# Patient Record
Sex: FEMALE | Race: WHITE | NOT HISPANIC OR LATINO
[De-identification: names, ages, dates, MRNs, and addresses within clinical notes are randomized per-mention and may not be internally consistent; named-entity substitution may affect disease eponyms.]

---

## 2018-09-04 ENCOUNTER — APPOINTMENT (OUTPATIENT)
Dept: UROGYNECOLOGY | Facility: CLINIC | Age: 73
End: 2018-09-04
Payer: MEDICARE

## 2018-09-04 DIAGNOSIS — N39.46 MIXED INCONTINENCE: ICD-10-CM

## 2018-09-04 DIAGNOSIS — R82.90 UNSPECIFIED ABNORMAL FINDINGS IN URINE: ICD-10-CM

## 2018-09-04 DIAGNOSIS — Z98.890 OTHER SPECIFIED POSTPROCEDURAL STATES: ICD-10-CM

## 2018-09-04 LAB
BILIRUB UR QL STRIP: NORMAL
CLARITY UR: CLEAR
COLLECTION METHOD: NORMAL
GLUCOSE UR-MCNC: NORMAL
HCG UR QL: 0.2 EU/DL
HGB UR QL STRIP.AUTO: NORMAL
KETONES UR-MCNC: NORMAL
LEUKOCYTE ESTERASE UR QL STRIP: NORMAL
NITRITE UR QL STRIP: NORMAL
PH UR STRIP: 6
PROT UR STRIP-MCNC: NORMAL
SP GR UR STRIP: 1.01

## 2018-09-04 PROCEDURE — 99213 OFFICE O/P EST LOW 20 MIN: CPT

## 2019-04-10 ENCOUNTER — APPOINTMENT (OUTPATIENT)
Dept: ORTHOPEDIC SURGERY | Facility: CLINIC | Age: 74
End: 2019-04-10

## 2019-10-04 ENCOUNTER — APPOINTMENT (OUTPATIENT)
Dept: RHEUMATOLOGY | Facility: CLINIC | Age: 74
End: 2019-10-04
Payer: MEDICARE

## 2019-10-04 VITALS
SYSTOLIC BLOOD PRESSURE: 112 MMHG | DIASTOLIC BLOOD PRESSURE: 76 MMHG | WEIGHT: 160 LBS | HEIGHT: 63 IN | OXYGEN SATURATION: 95 % | HEART RATE: 92 BPM | BODY MASS INDEX: 28.35 KG/M2 | TEMPERATURE: 98.1 F

## 2019-10-04 PROCEDURE — 99204 OFFICE O/P NEW MOD 45 MIN: CPT

## 2019-10-04 RX ORDER — ALENDRONATE SODIUM 70 MG/1
70 TABLET ORAL
Refills: 0 | Status: DISCONTINUED | COMMUNITY
End: 2019-10-04

## 2019-10-23 RX ADMIN — ZOLEDRONIC ACID 0 MG/100ML: 5 INJECTION, SOLUTION INTRAVENOUS at 00:00

## 2019-10-24 ENCOUNTER — APPOINTMENT (OUTPATIENT)
Dept: RHEUMATOLOGY | Facility: CLINIC | Age: 74
End: 2019-10-24

## 2019-10-28 ENCOUNTER — RX RENEWAL (OUTPATIENT)
Age: 74
End: 2019-10-28

## 2019-10-29 NOTE — HISTORY OF PRESENT ILLNESS
[FreeTextEntry1] : Previous treatments: Fosamax\par Prior fractures: None\par Parental hip fractures: \par Smoking history: Former\par Alcohol use: N\par Inflammatory arthritis/RA history: N\par Supplement use: Caltrate once daily, D3 50,000\par Hormone use: None. Normal age of menarche/menopause. Mid 40's.  Took estrogen/progesterone? N

## 2019-10-29 NOTE — PHYSICAL EXAM
[General Appearance - Alert] : alert [General Appearance - In No Acute Distress] : in no acute distress [General Appearance - Well Nourished] : well nourished [General Appearance - Well Developed] : well developed [General Appearance - Well-Appearing] : healthy appearing [Sclera] : the sclera and conjunctiva were normal [PERRL With Normal Accommodation] : pupils were equal in size, round, and reactive to light [Extraocular Movements] : extraocular movements were intact [Outer Ear] : the ears and nose were normal in appearance [Oropharynx] : the oropharynx was normal [Neck Appearance] : the appearance of the neck was normal [Neck Cervical Mass (___cm)] : no neck mass was observed [Jugular Venous Distention Increased] : there was no jugular-venous distention [Thyroid Diffuse Enlargement] : the thyroid was not enlarged [Thyroid Nodule] : there were no palpable thyroid nodules [Respiration, Rhythm And Depth] : normal respiratory rhythm and effort [Auscultation Breath Sounds / Voice Sounds] : lungs were clear to auscultation bilaterally [Heart Rate And Rhythm] : heart rate was normal and rhythm regular [Heart Sounds] : normal S1 and S2 [Heart Sounds Gallop] : no gallops [Murmurs] : no murmurs [Heart Sounds Pericardial Friction Rub] : no pericardial rub [Full Pulse] : the pedal pulses are present [Edema] : there was no peripheral edema [Bowel Sounds] : normal bowel sounds [Abdomen Soft] : soft [Abdomen Tenderness] : non-tender [Abdomen Mass (___ Cm)] : no abdominal mass palpated [Cervical Lymph Nodes Enlarged Posterior Bilaterally] : posterior cervical [Cervical Lymph Nodes Enlarged Anterior Bilaterally] : anterior cervical [Supraclavicular Lymph Nodes Enlarged Bilaterally] : supraclavicular [Axillary Lymph Nodes Enlarged Bilaterally] : axillary [No CVA Tenderness] : no ~M costovertebral angle tenderness [No Spinal Tenderness] : no spinal tenderness [Abnormal Walk] : normal gait [Nail Clubbing] : no clubbing  or cyanosis of the fingernails [Musculoskeletal - Swelling] : no joint swelling seen [Motor Tone] : muscle strength and tone were normal [FreeTextEntry1] : \par Hands- OA changes in B/L hands with Heberden and Austin's nodes. \par Wrists- normal\par Elbows- normal\par Shoulders- normal\par Hips- Reduced external rotation bilaterally. \par Knees- Patellofemoral crepitus bilaterally without effusion. \par Ankles- normal\par Feet- normal\par MMT 10/10 proximally/distally bilaterally.  [Skin Color & Pigmentation] : normal skin color and pigmentation [Skin Turgor] : normal skin turgor [] : no rash [Skin Lesions] : no skin lesions [Deep Tendon Reflexes (DTR)] : deep tendon reflexes were 2+ and symmetric [Sensation] : the sensory exam was normal to light touch and pinprick [Motor Exam] : the motor exam was normal [No Focal Deficits] : no focal deficits [Oriented To Time, Place, And Person] : oriented to person, place, and time [Impaired Insight] : insight and judgment were intact [Affect] : the affect was normal [Mood] : the mood was normal

## 2019-10-29 NOTE — ASSESSMENT
[FreeTextEntry1] : 73y F with osteoporosis at N noted 9/5/2019 (T-score -3.0) and LFN osteopenia (-2.4).  Her AP lumbar spine was normal L1-4, worst overall in L1 with -1.6 T-score.  BMD in [facility] is 0.617.  \par FRAX w/ Ten Year Major Osteoporotic Fracture Risk: 21.3%, Ten Year Hip Fracture Risk: 8.37%\par Pt was unable to tolerate oral bisphosphonate alendronate in the past and here to obtain treatment. Discussed ZA and Prolia today, pt is amenable to IV BPP for treatment.\par Degenerative OA affecting hands, knees, hips and lumbar spine- pain not well managed w/ OTC agents. Discussed safer alternative to NSAIDs beginning SNRI, may use NSAID sparingly.\par \par - increase caltrate twice daily w/ meals\par - c/w current D3 50,000 IU weekly as per PCP\par - labs reviewed\par - start ZA 5mg IV q12 months.  Labs q6 months including CBC, CMP w/ Mg/Phos, D3\par - start duloxetine 30mg/d for chronic MSK pain 2/2 OA\par - DEXA 9/2021 due to evaluate treatment response. If no improvements then consider Prolia SQ q6\par \par RTO 6-12 months, labs in 6 mos w/ PCP.  Re-evaluate yearly prior to infusion.

## 2019-11-09 ENCOUNTER — MOBILE ON CALL (OUTPATIENT)
Age: 74
End: 2019-11-09

## 2019-11-20 ENCOUNTER — APPOINTMENT (OUTPATIENT)
Dept: RHEUMATOLOGY | Facility: CLINIC | Age: 74
End: 2019-11-20
Payer: MEDICARE

## 2019-11-20 VITALS
DIASTOLIC BLOOD PRESSURE: 80 MMHG | TEMPERATURE: 98.4 F | SYSTOLIC BLOOD PRESSURE: 116 MMHG | OXYGEN SATURATION: 97 % | HEART RATE: 57 BPM | RESPIRATION RATE: 16 BRPM

## 2019-11-20 VITALS
RESPIRATION RATE: 17 BRPM | SYSTOLIC BLOOD PRESSURE: 128 MMHG | DIASTOLIC BLOOD PRESSURE: 67 MMHG | OXYGEN SATURATION: 98 % | TEMPERATURE: 98.4 F | HEART RATE: 60 BPM

## 2019-11-20 PROCEDURE — 96365 THER/PROPH/DIAG IV INF INIT: CPT

## 2019-11-20 RX ORDER — ZOLEDRONIC ACID 5 MG/100ML
5 INJECTION INTRAVENOUS
Qty: 0 | Refills: 0 | Status: COMPLETED | OUTPATIENT
Start: 2019-10-23

## 2019-12-27 ENCOUNTER — RX RENEWAL (OUTPATIENT)
Age: 74
End: 2019-12-27

## 2020-06-24 ENCOUNTER — APPOINTMENT (OUTPATIENT)
Dept: RHEUMATOLOGY | Facility: CLINIC | Age: 75
End: 2020-06-24

## 2020-07-16 ENCOUNTER — APPOINTMENT (OUTPATIENT)
Dept: RHEUMATOLOGY | Facility: CLINIC | Age: 75
End: 2020-07-16
Payer: MEDICARE

## 2020-07-16 VITALS
HEIGHT: 63 IN | BODY MASS INDEX: 27.46 KG/M2 | TEMPERATURE: 98.7 F | SYSTOLIC BLOOD PRESSURE: 118 MMHG | WEIGHT: 155 LBS | DIASTOLIC BLOOD PRESSURE: 73 MMHG

## 2020-07-16 PROCEDURE — 36415 COLL VENOUS BLD VENIPUNCTURE: CPT

## 2020-07-16 PROCEDURE — 99213 OFFICE O/P EST LOW 20 MIN: CPT | Mod: 25

## 2021-01-06 ENCOUNTER — APPOINTMENT (OUTPATIENT)
Dept: RHEUMATOLOGY | Facility: CLINIC | Age: 76
End: 2021-01-06
Payer: MEDICARE

## 2021-01-06 VITALS
DIASTOLIC BLOOD PRESSURE: 75 MMHG | RESPIRATION RATE: 16 BRPM | HEART RATE: 62 BPM | OXYGEN SATURATION: 97 % | SYSTOLIC BLOOD PRESSURE: 116 MMHG

## 2021-01-06 VITALS
DIASTOLIC BLOOD PRESSURE: 75 MMHG | HEART RATE: 59 BPM | RESPIRATION RATE: 16 BRPM | SYSTOLIC BLOOD PRESSURE: 124 MMHG | OXYGEN SATURATION: 100 %

## 2021-01-06 PROCEDURE — 96365 THER/PROPH/DIAG IV INF INIT: CPT

## 2021-01-06 RX ORDER — ZOLEDRONIC ACID 5 MG/100ML
5 INJECTION INTRAVENOUS
Qty: 0 | Refills: 0 | Status: COMPLETED | OUTPATIENT
Start: 2021-01-06

## 2021-02-09 ENCOUNTER — NON-APPOINTMENT (OUTPATIENT)
Age: 76
End: 2021-02-09

## 2021-02-09 ENCOUNTER — APPOINTMENT (OUTPATIENT)
Dept: GYNECOLOGIC ONCOLOGY | Facility: CLINIC | Age: 76
End: 2021-02-09
Payer: MEDICARE

## 2021-02-09 VITALS — WEIGHT: 155 LBS | BODY MASS INDEX: 27.46 KG/M2 | HEIGHT: 63 IN

## 2021-02-09 DIAGNOSIS — N83.209 UNSPECIFIED OVARIAN CYST, UNSPECIFIED SIDE: ICD-10-CM

## 2021-02-09 PROCEDURE — 76830 TRANSVAGINAL US NON-OB: CPT | Mod: 59

## 2021-02-09 PROCEDURE — 99204 OFFICE O/P NEW MOD 45 MIN: CPT | Mod: 25

## 2021-02-09 PROCEDURE — 76857 US EXAM PELVIC LIMITED: CPT | Mod: 59

## 2021-02-16 NOTE — CHIEF COMPLAINT
[FreeTextEntry1] : [facility]\par \par [facility] [phone] at [address] [place] [id number]\par

## 2021-02-16 NOTE — END OF VISIT
[FreeTextEntry3] : Written by Anahi ZHANG, acting as a scribe for Dr. Lexa Van.\par This note accurately reflects the work and decisions made by me.\par

## 2021-02-16 NOTE — HISTORY OF PRESENT ILLNESS
[FreeTextEntry1] : This 74yo ,  x 3, LMP at 50 h/o TVH by uro gyn in  for prolapse/SI, referrred by Dr. Adamson for evaluation of left ovarian cyst. Patient reports this cyst was initially found on a ctscan by her GI for workup to r/o abd mass/GI bleed. Pelvic US in May 2020 revealed nonvisualization of the 2cm left adnexal  lesion noted on Ct scan. MRI pelvis on 11/3/20-2cm left ovarian fibroma, right ovary not visualized. Denies pelvic or abdominal pains. Denies VB or vag d/c. Denies bowel or bladder issues.   \par \par Pap [id number] h/o abn paps \par Mammo-10/2020-eports wnl \par Eyurxmrrnto-0000-ktkqfe removed  \par DEXA-10/2019-osteoporosis\par

## 2021-02-16 NOTE — PHYSICAL EXAM
[Normal] : Bimanual Exam: Normal [de-identified] : Patient was interviewed and examined with chaperone present. Name of chaperone: Anahi Ochoa

## 2021-02-16 NOTE — ASSESSMENT
[FreeTextEntry1] : 76 yo female with 2cm right ovarian fibroma which appears benign. Discussed my low suspicion for malignancy. I am recommending a six month pelvic US to follow this cyst. If remains stable, she can resume yearly pelvic sonograms. \par \par In a study published in November 2018 in [facility], William et al. reviewed over 70,000 pelvic ultrasounds in a large unselected population to assess for the risk of ovarian cancer when an ovarian cyst was identified. However, their data did not support this concern particularly if the cyst was less than 7 cm or Simple cyst. We discussed that she has a solid cyst without any cystic areas which would make it more concerning for a malignancy.  She also had MRI evaluation which did not show increased perfusion, a characteristic of malignancy. We discussed there is no pelvic fluid or evidence of lymphadenopathy, which are all reassuring. The management options at this time would include surgical removal or observation. She agrees to observation given very low risk of this cyst being malignant. She understands that short of removal of the ovary we can never tell 100% if a cyst is malignant or benign, however, given all the information it is very reassuring that the cyst is benign. All questions answered.\par

## 2021-03-24 ENCOUNTER — APPOINTMENT (OUTPATIENT)
Dept: OBGYN | Facility: CLINIC | Age: 76
End: 2021-03-24

## 2021-04-07 ENCOUNTER — APPOINTMENT (OUTPATIENT)
Dept: OBGYN | Facility: CLINIC | Age: 76
End: 2021-04-07

## 2021-06-01 ENCOUNTER — FORM ENCOUNTER (OUTPATIENT)
Age: 76
End: 2021-06-01

## 2021-06-02 ENCOUNTER — APPOINTMENT (OUTPATIENT)
Dept: OBGYN | Facility: CLINIC | Age: 76
End: 2021-06-02
Payer: MEDICARE

## 2021-06-02 PROCEDURE — 99203 OFFICE O/P NEW LOW 30 MIN: CPT

## 2021-06-21 ENCOUNTER — APPOINTMENT (OUTPATIENT)
Dept: DERMATOLOGY | Facility: CLINIC | Age: 76
End: 2021-06-21

## 2021-08-05 ENCOUNTER — APPOINTMENT (OUTPATIENT)
Dept: GYNECOLOGIC ONCOLOGY | Facility: CLINIC | Age: 76
End: 2021-08-05

## 2021-08-18 ENCOUNTER — FORM ENCOUNTER (OUTPATIENT)
Age: 76
End: 2021-08-18

## 2021-10-13 ENCOUNTER — APPOINTMENT (OUTPATIENT)
Dept: RHEUMATOLOGY | Facility: CLINIC | Age: 76
End: 2021-10-13
Payer: MEDICARE

## 2021-10-13 VITALS
TEMPERATURE: 97.8 F | HEART RATE: 71 BPM | SYSTOLIC BLOOD PRESSURE: 138 MMHG | WEIGHT: 143 LBS | BODY MASS INDEX: 25.34 KG/M2 | HEIGHT: 63 IN | DIASTOLIC BLOOD PRESSURE: 78 MMHG | RESPIRATION RATE: 17 BRPM | OXYGEN SATURATION: 98 %

## 2021-10-13 DIAGNOSIS — M54.9 DORSALGIA, UNSPECIFIED: ICD-10-CM

## 2021-10-13 PROCEDURE — 99214 OFFICE O/P EST MOD 30 MIN: CPT

## 2021-10-31 PROBLEM — M54.9 BACK PAIN: Status: ACTIVE | Noted: 2019-10-04

## 2021-11-23 ENCOUNTER — APPOINTMENT (OUTPATIENT)
Dept: OBGYN | Facility: CLINIC | Age: 76
End: 2021-11-23
Payer: MEDICARE

## 2021-11-23 VITALS
WEIGHT: 143 LBS | DIASTOLIC BLOOD PRESSURE: 70 MMHG | SYSTOLIC BLOOD PRESSURE: 118 MMHG | BODY MASS INDEX: 25.34 KG/M2 | HEIGHT: 63 IN

## 2021-11-23 DIAGNOSIS — Z01.419 ENCOUNTER FOR GYNECOLOGICAL EXAMINATION (GENERAL) (ROUTINE) W/OUT ABNORMAL FINDINGS: ICD-10-CM

## 2021-11-23 PROCEDURE — G0101: CPT | Mod: GA

## 2021-11-23 NOTE — DISCUSSION/SUMMARY
[FreeTextEntry1] : health care maintenance\par -pap\par -vit D/exercise\par -breast mammo/sono up to date\par -colon screening reviewed\par -f/u PCP for annual and appropriate immunizations\par -rto 1 year\par \par stable benign appearing left ov fibroma\par -yearly imaging\par -pt asx\par \par hiatal hernia-\par -refer to Dr. Torres for consultation

## 2021-11-23 NOTE — HISTORY OF PRESENT ILLNESS
[Y] : Positive pregnancy history [FreeTextEntry1] : JANELLE LUONG is a 75 year  postmen female presenting for annual.  no complaints. pt has been following benign appearing 2cm left ovarian fibroma. asx.\par \par MRI Pelvic sono 2021\par IMPRESSION:\par Stable 2 cm left ovarian fibroma, benign.\par \par Status post hysterectomy with mild vaginal prolapse and rectocele.\par \par Pelvic with transvaginal ultrasound 21\par IMPRESSION:\par 1. Hysterectomy \par 2. Ovaries not well visualized; follow-up pelvic MRI as clinically indicated.  [PGHxTotal] : 3 [[facility]] : 3 [[facility]] : 3

## 2021-11-23 NOTE — PHYSICAL EXAM
[Chaperone Present] : A chaperone was present in the examining room during all aspects of the physical examination [FreeTextEntry1] : Blaire Helms [Appropriately responsive] : appropriately responsive [Alert] : alert [No Lymphadenopathy] : no lymphadenopathy [Examination Of The Breasts] : a normal appearance [No Masses] : no breast masses were palpable [Labia Majora] : normal [Labia Minora] : normal [Normal] : normal [Absent] : absent [Uterine Adnexae] : normal [FreeTextEntry9] : deferred

## 2021-11-23 NOTE — END OF VISIT
[FreeTextEntry3] : I, Amber Chu, acted solely as a scribe for Dr. Itz Cruz, on 11/23/2021. \par \par All medical record entries made by the scribe were at my, Dr. Itz Cruz., direction and personally dictated by me on 11/23/2021. I have personally reviewed the chart and agree that the record accurately reflects my personal performance of the history, physical exam, assessment and plan.\par

## 2022-01-24 ENCOUNTER — APPOINTMENT (OUTPATIENT)
Dept: RHEUMATOLOGY | Facility: CLINIC | Age: 77
End: 2022-01-24
Payer: MEDICARE

## 2022-01-24 VITALS
OXYGEN SATURATION: 98 % | RESPIRATION RATE: 18 BRPM | HEART RATE: 67 BPM | DIASTOLIC BLOOD PRESSURE: 78 MMHG | TEMPERATURE: 97.8 F | SYSTOLIC BLOOD PRESSURE: 121 MMHG

## 2022-01-24 VITALS
DIASTOLIC BLOOD PRESSURE: 75 MMHG | RESPIRATION RATE: 18 BRPM | SYSTOLIC BLOOD PRESSURE: 113 MMHG | HEART RATE: 56 BPM | TEMPERATURE: 97.8 F | OXYGEN SATURATION: 98 %

## 2022-01-24 PROCEDURE — 96365 THER/PROPH/DIAG IV INF INIT: CPT

## 2022-01-24 RX ORDER — ZOLEDRONIC ACID 5 MG/100ML
5 INJECTION INTRAVENOUS
Qty: 0 | Refills: 0 | Status: COMPLETED | OUTPATIENT
Start: 2022-01-18

## 2022-01-26 ENCOUNTER — NON-APPOINTMENT (OUTPATIENT)
Age: 77
End: 2022-01-26

## 2022-07-13 ENCOUNTER — APPOINTMENT (OUTPATIENT)
Dept: RHEUMATOLOGY | Facility: CLINIC | Age: 77
End: 2022-07-13

## 2022-07-13 VITALS — DIASTOLIC BLOOD PRESSURE: 76 MMHG | SYSTOLIC BLOOD PRESSURE: 110 MMHG | TEMPERATURE: 98.1 F | HEIGHT: 63 IN

## 2022-07-13 PROCEDURE — 99214 OFFICE O/P EST MOD 30 MIN: CPT

## 2022-07-13 RX ORDER — NIRMATRELVIR AND RITONAVIR 300-100 MG
20 X 150 MG & KIT ORAL
Qty: 30 | Refills: 0 | Status: COMPLETED | COMMUNITY
Start: 2022-06-06

## 2022-07-13 RX ORDER — ERGOCALCIFEROL 1.25 MG/1
1.25 MG CAPSULE, LIQUID FILLED ORAL
Qty: 12 | Refills: 3 | Status: ACTIVE | COMMUNITY
Start: 1900-01-01 | End: 1900-01-01

## 2022-11-01 ENCOUNTER — APPOINTMENT (OUTPATIENT)
Dept: OTOLARYNGOLOGY | Facility: CLINIC | Age: 77
End: 2022-11-01

## 2022-11-01 VITALS
WEIGHT: 143 LBS | HEIGHT: 63 IN | DIASTOLIC BLOOD PRESSURE: 83 MMHG | BODY MASS INDEX: 25.34 KG/M2 | SYSTOLIC BLOOD PRESSURE: 130 MMHG | HEART RATE: 71 BPM

## 2022-11-01 PROCEDURE — 99203 OFFICE O/P NEW LOW 30 MIN: CPT

## 2022-11-01 NOTE — PHYSICAL EXAM
[FreeTextEntry1] : mobile L TOP tumor [Midline] : trachea located in midline position [Normal] : no rashes

## 2022-11-01 NOTE — CONSULT LETTER
[Dear  ___] : Dear  [unfilled], [Consult Letter:] : I had the pleasure of evaluating your patient, [unfilled]. [Please see my note below.] : Please see my note below. [Consult Closing:] : Thank you very much for allowing me to participate in the care of this patient.  If you have any questions, please do not hesitate to contact me. [Sincerely,] : Sincerely, [FreeTextEntry2] : Adriana Bangura MD  [FreeTextEntry3] : Jack Hill MD, FACS\par \par    [facility]\par Associate Chair\par    Department of Otolaryngology\par \par Professor\par Otolaryngology & Molecular Medicine\par [facility] [employer]\par

## 2022-11-01 NOTE — HISTORY OF PRESENT ILLNESS
[None] : No associated symptoms are reported. [de-identified] : Ms. 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 \par Has not had biopsy. \par Denies pain, dysphagia, dysphonia and or dyspnea. No otalgia. \par No cardiac or lung issues. Recently told to have anemia. SHe is on Xarelto 20mg daily with  ASA 81mg for PVD stents.\par former smoker. Smoked about 1/2 ppd, quit 8 years ago. no alcohol. \par \par no hx skin CA.

## 2022-12-02 ENCOUNTER — RESULT REVIEW (OUTPATIENT)
Age: 77
End: 2022-12-02

## 2022-12-02 ENCOUNTER — APPOINTMENT (OUTPATIENT)
Dept: INTERVENTIONAL RADIOLOGY/VASCULAR | Facility: CLINIC | Age: 77
End: 2022-12-02

## 2022-12-02 ENCOUNTER — OUTPATIENT (OUTPATIENT)
Dept: OUTPATIENT SERVICES | Facility: HOSPITAL | Age: 77
LOS: 1 days | End: 2022-12-02

## 2022-12-02 DIAGNOSIS — K11.9 DISEASE OF SALIVARY GLAND, UNSPECIFIED: ICD-10-CM

## 2022-12-02 DIAGNOSIS — Z98.89 OTHER SPECIFIED POSTPROCEDURAL STATES: Chronic | ICD-10-CM

## 2022-12-02 PROCEDURE — 10005 FNA BX W/US GDN 1ST LES: CPT

## 2022-12-03 PROCEDURE — 88173 CYTOPATH EVAL FNA REPORT: CPT | Mod: 26

## 2022-12-03 PROCEDURE — 88305 TISSUE EXAM BY PATHOLOGIST: CPT | Mod: 26

## 2022-12-06 ENCOUNTER — TRANSCRIPTION ENCOUNTER (OUTPATIENT)
Age: 77
End: 2022-12-06

## 2022-12-06 LAB — NON-GYNECOLOGICAL CYTOLOGY STUDY: SIGNIFICANT CHANGE UP

## 2022-12-07 DIAGNOSIS — Z87.891 PERSONAL HISTORY OF NICOTINE DEPENDENCE: ICD-10-CM

## 2023-07-12 ENCOUNTER — APPOINTMENT (OUTPATIENT)
Dept: RHEUMATOLOGY | Facility: CLINIC | Age: 78
End: 2023-07-12
Payer: MEDICARE

## 2023-07-12 VITALS
SYSTOLIC BLOOD PRESSURE: 98 MMHG | HEIGHT: 63 IN | DIASTOLIC BLOOD PRESSURE: 58 MMHG | OXYGEN SATURATION: 96 % | HEART RATE: 89 BPM

## 2023-07-12 DIAGNOSIS — M47.816 SPONDYLOSIS W/OUT MYELOPATHY OR RADICULOPATHY, LUMBAR REGION: ICD-10-CM

## 2023-07-12 DIAGNOSIS — Z79.83 AGE-RELATED OSTEOPOROSIS W/OUT CURRENT PATHOLOGICAL FRACTURE: ICD-10-CM

## 2023-07-12 DIAGNOSIS — M81.0 AGE-RELATED OSTEOPOROSIS W/OUT CURRENT PATHOLOGICAL FRACTURE: ICD-10-CM

## 2023-07-12 PROCEDURE — 99214 OFFICE O/P EST MOD 30 MIN: CPT

## 2023-07-12 RX ORDER — ZOLEDRONIC ACID 5 MG/100ML
5 INJECTION INTRAVENOUS
Refills: 0 | Status: COMPLETED | OUTPATIENT
Start: 2023-07-12 | End: 1900-01-01

## 2023-07-12 RX ADMIN — ZOLEDRONIC ACID 0 MG/100ML: 5 INJECTION, SOLUTION INTRAVENOUS at 00:00

## 2023-07-14 PROBLEM — M81.0 POST-MENOPAUSAL OSTEOPOROSIS: Status: ACTIVE | Noted: 2019-10-04

## 2023-07-14 PROBLEM — M81.0 ENCOUNTER FOR ONGOING OSTEOPOROSIS BISPHOSPHONATE THERAPY: Status: ACTIVE | Noted: 2020-07-27

## 2023-07-14 PROBLEM — M47.816 LUMBAR SPONDYLOSIS: Status: ACTIVE | Noted: 2019-10-04

## 2023-08-01 ENCOUNTER — NON-APPOINTMENT (OUTPATIENT)
Age: 78
End: 2023-08-01

## 2023-08-01 DIAGNOSIS — Z82.49 FAMILY HISTORY OF ISCHEMIC HEART DISEASE AND OTHER DISEASES OF THE CIRCULATORY SYSTEM: ICD-10-CM

## 2023-08-01 DIAGNOSIS — I95.89 OTHER HYPOTENSION: ICD-10-CM

## 2023-08-01 DIAGNOSIS — Z92.89 PERSONAL HISTORY OF OTHER MEDICAL TREATMENT: ICD-10-CM

## 2023-08-01 DIAGNOSIS — R42 DIZZINESS AND GIDDINESS: ICD-10-CM

## 2023-08-01 DIAGNOSIS — R00.1 BRADYCARDIA, UNSPECIFIED: ICD-10-CM

## 2023-08-07 ENCOUNTER — APPOINTMENT (OUTPATIENT)
Dept: CARDIOLOGY | Facility: CLINIC | Age: 78
End: 2023-08-07

## 2023-08-08 ENCOUNTER — APPOINTMENT (OUTPATIENT)
Dept: CARDIOLOGY | Facility: CLINIC | Age: 78
End: 2023-08-08
Payer: MEDICARE

## 2023-08-08 VITALS
HEART RATE: 73 BPM | BODY MASS INDEX: 22.5 KG/M2 | SYSTOLIC BLOOD PRESSURE: 110 MMHG | DIASTOLIC BLOOD PRESSURE: 66 MMHG | OXYGEN SATURATION: 99 % | TEMPERATURE: 98.8 F | HEIGHT: 63 IN | WEIGHT: 127 LBS

## 2023-08-08 VITALS — SYSTOLIC BLOOD PRESSURE: 116 MMHG | DIASTOLIC BLOOD PRESSURE: 66 MMHG

## 2023-08-08 DIAGNOSIS — Z01.810 ENCOUNTER FOR PREPROCEDURAL CARDIOVASCULAR EXAMINATION: ICD-10-CM

## 2023-08-08 PROCEDURE — 93000 ELECTROCARDIOGRAM COMPLETE: CPT

## 2023-08-08 PROCEDURE — 99214 OFFICE O/P EST MOD 30 MIN: CPT

## 2023-08-08 RX ORDER — RIVAROXABAN 20 MG/1
20 TABLET, FILM COATED ORAL
Qty: 30 | Refills: 0 | Status: DISCONTINUED | COMMUNITY
Start: 2022-04-18 | End: 2023-08-08

## 2023-08-08 RX ORDER — CYCLOBENZAPRINE HCL 5 MG
5 TABLET ORAL
Refills: 0 | Status: DISCONTINUED | COMMUNITY
End: 2023-08-08

## 2023-08-08 RX ORDER — AMLODIPINE BESYLATE 5 MG/1
5 TABLET ORAL
Qty: 90 | Refills: 0 | Status: DISCONTINUED | COMMUNITY
Start: 2022-04-26 | End: 2023-08-08

## 2023-08-08 RX ORDER — ATORVASTATIN CALCIUM 40 MG/1
40 TABLET, FILM COATED ORAL
Refills: 0 | Status: DISCONTINUED | COMMUNITY
End: 2023-08-08

## 2023-08-08 RX ORDER — OMEPRAZOLE 40 MG/1
40 CAPSULE, DELAYED RELEASE ORAL DAILY
Refills: 0 | Status: ACTIVE | COMMUNITY

## 2023-08-08 RX ORDER — VALSARTAN AND HYDROCHLOROTHIAZIDE 320; 25 MG/1; MG/1
320-25 TABLET, FILM COATED ORAL
Refills: 0 | Status: DISCONTINUED | COMMUNITY
End: 2023-08-08

## 2023-08-08 RX ORDER — ATENOLOL 100 MG/1
100 TABLET ORAL
Refills: 0 | Status: DISCONTINUED | COMMUNITY
End: 2023-08-08

## 2023-08-08 RX ORDER — ZOLEDRONIC ACID 5 MG/100ML
5 INJECTION INTRAVENOUS
Qty: 1 | Refills: 0 | Status: DISCONTINUED | COMMUNITY
Start: 2019-10-04 | End: 2023-08-08

## 2023-08-08 NOTE — CARDIOLOGY SUMMARY
[de-identified] : Normal sinus rhythm, increased RS ratio V2, no significant change from 7/10/2023 [de-identified] : Active Problems - Hypercholesterolemia - Hypertension: Home blood pressure cuff accurate - Former Nicotine Dependence - Peripheral Arterial Disease: History of b/l LE percutaneous revascularization. Placed on Xarelto, aspirin, and cilostazol by vascular surgery - Family History of premature CAD: Brother CABG 40 - ECHOCARDIOGRAM: 6/22/2023, [facility], EF 55-60%, No PFO with agitated saline, trace mitral regurgitation, mild to moderate tricuspid regurgitation RVSP 23  - PHARMACOLOGIC NUCLEAR STRESS TEST: 5/25/2021, normal, EF >65% - CAROTID ULTRASOUND: 7/25/2022, LETI >70%, LICA 50-69%, LECA >50%, s/p R CEA 8/21/2022, L CEA 12/2022; CTA neck: 6/22/2023, B/L ICAs <50% - Anemia

## 2023-08-08 NOTE — HISTORY OF PRESENT ILLNESS
[FreeTextEntry1] : The patient is a 77-year-old white female with a past medical history remarkable for hypertension, hypercholesterolemia, carotid artery disease and peripheral arterial disease who presents for evaluation prior to urgent lower extremity revascularization scheduled for 8/10/2023. The patient's activity is limited by claudication.  She is chest pain and dyspnea free.  Her cholesterol from 7/6/2023 is at target.

## 2023-08-08 NOTE — PHYSICAL EXAM
[Well Developed] : well developed [Well Nourished] : well nourished [No Acute Distress] : no acute distress [Normal Conjunctiva] : normal conjunctiva [Normal Venous Pressure] : normal venous pressure [No Carotid Bruit] : no carotid bruit [Normal S1, S2] : normal S1, S2 [No Murmur] : no murmur [No Rub] : no rub [No Gallop] : no gallop [Clear Lung Fields] : clear lung fields [Good Air Entry] : good air entry [No Respiratory Distress] : no respiratory distress  [Soft] : abdomen soft [Non Tender] : non-tender [No Masses/organomegaly] : no masses/organomegaly [Normal Bowel Sounds] : normal bowel sounds [Normal Gait] : normal gait [No Edema] : no edema [No Cyanosis] : no cyanosis [No Rash] : no rash [No Skin Lesions] : no skin lesions [Moves all extremities] : moves all extremities [No Focal Deficits] : no focal deficits [Normal Speech] : normal speech [Alert and Oriented] : alert and oriented [Normal memory] : normal memory

## 2023-08-08 NOTE — DISCUSSION/SUMMARY
[FreeTextEntry1] : 1 preoperative cardiovascular evaluation: Urgent lower extremity revascularization 8/10/2023 In view of the patient's normal ejection fraction, normal stress test, and the absence of unstable angina, congestive heart failure, or severe aortic stenosis, she is at average risk for perioperative cardiovascular morbidity and mortality, and cleared from a cardiology standpoint for urgent surgery with the following recommendations: The patient was instructed to take her atenolol with a sip of water the morning of the procedure. Endocarditis prophylaxis in the post procedure monitored bed or not indicated DVT prophylaxis at your discretion Aspirin, cilostazol, and Xarelto dosing at your discretion 2 hypertension: Well-controlled 3 hypercholesterolemia: At target 7/6/2023  [EKG obtained to assist in diagnosis and management of assessed problem(s)] : EKG obtained to assist in diagnosis and management of assessed problem(s)

## 2023-08-14 ENCOUNTER — APPOINTMENT (OUTPATIENT)
Dept: CARDIOLOGY | Facility: CLINIC | Age: 78
End: 2023-08-14
Payer: MEDICARE

## 2023-08-14 VITALS
SYSTOLIC BLOOD PRESSURE: 129 MMHG | BODY MASS INDEX: 22.86 KG/M2 | HEIGHT: 63 IN | DIASTOLIC BLOOD PRESSURE: 73 MMHG | WEIGHT: 129 LBS | OXYGEN SATURATION: 98 % | HEART RATE: 84 BPM

## 2023-08-14 DIAGNOSIS — I95.2 HYPOTENSION DUE TO DRUGS: ICD-10-CM

## 2023-08-14 PROCEDURE — 99214 OFFICE O/P EST MOD 30 MIN: CPT

## 2023-08-14 RX ORDER — ATORVASTATIN CALCIUM 80 MG/1
80 TABLET, FILM COATED ORAL
Qty: 90 | Refills: 3 | Status: ACTIVE | COMMUNITY
Start: 1900-01-01 | End: 1900-01-01

## 2023-08-14 RX ORDER — EZETIMIBE 10 MG/1
10 TABLET ORAL
Qty: 90 | Refills: 3 | Status: ACTIVE | COMMUNITY
Start: 1900-01-01 | End: 1900-01-01

## 2023-08-14 NOTE — CARDIOLOGY SUMMARY
[de-identified] : Normal sinus rhythm, increased RS ratio V2, no significant change from 7/10/2023 [de-identified] : Active Problems - Hypercholesterolemia - Hypertension: Home blood pressure cuff accurate - Former Nicotine Dependence - Peripheral Arterial Disease: History of b/l LE percutaneous revascularization. Placed on Xarelto, aspirin, and cilostazol by vascular surgery - Family History of premature CAD: Brother CABG 40 - ECHOCARDIOGRAM: 6/22/2023, [facility], EF 55-60%, No PFO with agitated saline, trace mitral regurgitation, mild to moderate tricuspid regurgitation RVSP 23  - PHARMACOLOGIC NUCLEAR STRESS TEST: 5/25/2021, normal, EF >65% - CAROTID ULTRASOUND: 7/25/2022, LETI >70%, LICA 50-69%, LECA >50%, s/p R CEA 8/21/2022, L CEA 12/2022; CTA neck: 6/22/2023, B/L ICAs <50% - Anemia

## 2023-08-14 NOTE — DISCUSSION/SUMMARY
[FreeTextEntry1] : Patient presents to the office for follow up of hypotension.   Assessment/ Plan: 1. Hypotension now resolved off ARB and Amlodipine. BP well controlled on Atenolol alone. Advised to continue home BP monitoring and report consistently elevated or low BP readings.  2. hypercholesterolemia: At target 7/6/2023 3. Follow up with Dr. Lanier in 6 months as scheduled or sooner if needed.  Patient was advised to contact the office or seek emergency medical care for any new, worsening or concerning symptoms. Patient verbalized understanding and is in agreement with the above plan.  Corina Lanier was present in office at the time of visit.

## 2023-08-14 NOTE — PHYSICAL EXAM
[Well Developed] : well developed [No Acute Distress] : no acute distress [No Carotid Bruit] : no carotid bruit [Normal S1, S2] : normal S1, S2 [No Murmur] : no murmur [Clear Lung Fields] : clear lung fields [No Respiratory Distress] : no respiratory distress  [Normal Gait] : normal gait [Normal PT B/L] : normal PT B/L [Edema ___] : edema [unfilled] [Moves all extremities] : moves all extremities [Normal Speech] : normal speech [Alert and Oriented] : alert and oriented [Normal memory] : normal memory

## 2023-08-14 NOTE — HISTORY OF PRESENT ILLNESS
[FreeTextEntry1] : The patient is a 77-year-old white female with a past medical history remarkable for hypertension, hypercholesterolemia, carotid artery disease and peripheral arterial disease who presents for follow up of hypotension. She underwent urgent lower extremity revascularization on 8/10/2023; states procedure went as planned. She follows with Dr. Abrams at [facility] for vascular needs. Her cholesterol from 7/6/2023 is at target. Reports feeling well today. States she has been off of Valsartan and Amlodipine for several weeks and BP has improved back to normal range. BP at home is usually 110-120 systolic/ 50-60s diastolic. Prior to stopping her BP medications, she was feeling dizzy and lightheaded with BP < 100 systolic. Denies chest pain, SOB at rest, MURDOCK, palpitations, lightheadedness, dizziness, fatigue, syncope, near syncope and LE edema.

## 2023-09-18 ENCOUNTER — APPOINTMENT (OUTPATIENT)
Dept: RHEUMATOLOGY | Facility: CLINIC | Age: 78
End: 2023-09-18
Payer: MEDICARE

## 2023-09-18 VITALS
TEMPERATURE: 97.3 F | RESPIRATION RATE: 19 BRPM | DIASTOLIC BLOOD PRESSURE: 94 MMHG | OXYGEN SATURATION: 97 % | SYSTOLIC BLOOD PRESSURE: 167 MMHG | HEART RATE: 83 BPM

## 2023-09-18 VITALS
HEART RATE: 70 BPM | RESPIRATION RATE: 17 BRPM | DIASTOLIC BLOOD PRESSURE: 78 MMHG | OXYGEN SATURATION: 98 % | SYSTOLIC BLOOD PRESSURE: 131 MMHG | TEMPERATURE: 98.1 F

## 2023-09-18 PROCEDURE — 96365 THER/PROPH/DIAG IV INF INIT: CPT

## 2023-09-18 RX ORDER — ZOLEDRONIC ACID 5 MG/100ML
5 INJECTION INTRAVENOUS
Qty: 0 | Refills: 0 | Status: COMPLETED
Start: 2023-07-12

## 2023-10-23 ENCOUNTER — APPOINTMENT (OUTPATIENT)
Dept: CARDIOLOGY | Facility: CLINIC | Age: 78
End: 2023-10-23
Payer: MEDICARE

## 2023-10-23 VITALS
HEIGHT: 63 IN | BODY MASS INDEX: 22.15 KG/M2 | HEART RATE: 87 BPM | DIASTOLIC BLOOD PRESSURE: 66 MMHG | WEIGHT: 125 LBS | OXYGEN SATURATION: 99 % | SYSTOLIC BLOOD PRESSURE: 108 MMHG

## 2023-10-23 DIAGNOSIS — I77.9 DISORDER OF ARTERIES AND ARTERIOLES, UNSPECIFIED: ICD-10-CM

## 2023-10-23 DIAGNOSIS — I95.9 HYPOTENSION, UNSPECIFIED: ICD-10-CM

## 2023-10-23 PROCEDURE — 99214 OFFICE O/P EST MOD 30 MIN: CPT

## 2023-10-23 RX ORDER — CHROMIUM 200 MCG
800 TABLET ORAL
Refills: 0 | Status: DISCONTINUED | COMMUNITY
End: 2023-10-23

## 2023-10-23 RX ORDER — CILOSTAZOL 100 MG/1
100 TABLET ORAL TWICE DAILY
Refills: 0 | Status: DISCONTINUED | COMMUNITY
End: 2023-10-23

## 2023-10-23 RX ORDER — CYANOCOBALAMIN (VITAMIN B-12) 1000 MCG
1000 TABLET ORAL DAILY
Refills: 0 | Status: ACTIVE | COMMUNITY

## 2023-10-23 RX ORDER — PREDNISONE 20 MG/1
20 TABLET ORAL
Qty: 10 | Refills: 2 | Status: DISCONTINUED | COMMUNITY
Start: 2020-09-01 | End: 2023-10-23

## 2023-10-23 RX ORDER — VALSARTAN 320 MG/1
320 TABLET, COATED ORAL
Qty: 90 | Refills: 0 | Status: DISCONTINUED | COMMUNITY
Start: 2022-01-10 | End: 2023-10-23

## 2023-10-23 RX ORDER — DULOXETINE HYDROCHLORIDE 30 MG/1
30 CAPSULE, DELAYED RELEASE PELLETS ORAL DAILY
Qty: 180 | Refills: 3 | Status: DISCONTINUED | COMMUNITY
Start: 2019-10-04 | End: 2023-10-23

## 2023-10-23 RX ORDER — DM/PE/ACETAMINOPHEN/CHLORPHENR 10-5-325-2
600-800 TABLET, SEQUENTIAL ORAL WEEKLY
Refills: 0 | Status: ACTIVE | COMMUNITY

## 2023-11-20 ENCOUNTER — NON-APPOINTMENT (OUTPATIENT)
Age: 78
End: 2023-11-20

## 2023-11-20 ENCOUNTER — APPOINTMENT (OUTPATIENT)
Dept: CARDIOLOGY | Facility: CLINIC | Age: 78
End: 2023-11-20
Payer: MEDICARE

## 2023-11-20 VITALS
HEART RATE: 68 BPM | DIASTOLIC BLOOD PRESSURE: 80 MMHG | BODY MASS INDEX: 21.97 KG/M2 | OXYGEN SATURATION: 97 % | HEIGHT: 63 IN | WEIGHT: 124 LBS | SYSTOLIC BLOOD PRESSURE: 132 MMHG

## 2023-11-20 DIAGNOSIS — R42 DIZZINESS AND GIDDINESS: ICD-10-CM

## 2023-11-20 PROCEDURE — 99214 OFFICE O/P EST MOD 30 MIN: CPT

## 2023-11-20 PROCEDURE — 93000 ELECTROCARDIOGRAM COMPLETE: CPT

## 2023-11-20 RX ORDER — RIVAROXABAN 2.5 MG/1
2.5 TABLET, FILM COATED ORAL
Qty: 60 | Refills: 3 | Status: ACTIVE | COMMUNITY

## 2023-11-21 ENCOUNTER — APPOINTMENT (OUTPATIENT)
Dept: ULTRASOUND IMAGING | Facility: CLINIC | Age: 78
End: 2023-11-21

## 2023-11-21 ENCOUNTER — OUTPATIENT (OUTPATIENT)
Dept: OUTPATIENT SERVICES | Facility: HOSPITAL | Age: 78
LOS: 1 days | End: 2023-11-21
Payer: MEDICARE

## 2023-11-21 DIAGNOSIS — Z98.89 OTHER SPECIFIED POSTPROCEDURAL STATES: Chronic | ICD-10-CM

## 2023-11-21 DIAGNOSIS — Z00.8 ENCOUNTER FOR OTHER GENERAL EXAMINATION: ICD-10-CM

## 2023-11-21 PROCEDURE — 76536 US EXAM OF HEAD AND NECK: CPT | Mod: 26

## 2023-11-21 PROCEDURE — 76536 US EXAM OF HEAD AND NECK: CPT

## 2023-12-05 DIAGNOSIS — K11.9 DISEASE OF SALIVARY GLAND, UNSPECIFIED: ICD-10-CM

## 2024-02-05 ENCOUNTER — NON-APPOINTMENT (OUTPATIENT)
Age: 79
End: 2024-02-05

## 2024-02-06 ENCOUNTER — APPOINTMENT (OUTPATIENT)
Dept: UROGYNECOLOGY | Facility: CLINIC | Age: 79
End: 2024-02-06
Payer: MEDICARE

## 2024-02-06 VITALS
WEIGHT: 125 LBS | DIASTOLIC BLOOD PRESSURE: 82 MMHG | HEIGHT: 63 IN | BODY MASS INDEX: 22.15 KG/M2 | SYSTOLIC BLOOD PRESSURE: 161 MMHG

## 2024-02-06 LAB
BILIRUB UR QL STRIP: NEGATIVE
CLARITY UR: CLEAR
COLLECTION METHOD: NORMAL
GLUCOSE UR-MCNC: NEGATIVE
HCG UR QL: 1 EU/DL
HGB UR QL STRIP.AUTO: NEGATIVE
KETONES UR-MCNC: NEGATIVE
LEUKOCYTE ESTERASE UR QL STRIP: NEGATIVE
NITRITE UR QL STRIP: NEGATIVE
PH UR STRIP: 5.5
PROT UR STRIP-MCNC: NEGATIVE
SP GR UR STRIP: 1.02

## 2024-02-06 PROCEDURE — 51701 INSERT BLADDER CATHETER: CPT | Mod: 59

## 2024-02-06 PROCEDURE — 99214 OFFICE O/P EST MOD 30 MIN: CPT | Mod: 25

## 2024-02-06 PROCEDURE — 99459 PELVIC EXAMINATION: CPT

## 2024-02-06 PROCEDURE — 81003 URINALYSIS AUTO W/O SCOPE: CPT | Mod: QW

## 2024-02-06 NOTE — PHYSICAL EXAM
[Chaperone Present] : A chaperone was present in the examining room during all aspects of the physical examination [No Acute Distress] : in no acute distress [Well developed] : well developed [Well Nourished] : ~L well nourished [Oriented x3] : oriented to person, place, and time [No Edema] : ~T edema was not present [Warm and Dry] : was warm and dry to touch [Vulvar Atrophy] : vulvar atrophy [Labia Majora] : were normal [Labia Minora] : were normal [Normal Appearance] : general appearance was normal [Atrophy] : atrophy [Normal] : no abnormalities [Post Void Residual ____ml] : post void residual was [unfilled] ml [Absent] : absent [Cough] : no cough [Tenderness] : ~T no ~M abdominal tenderness observed [Distended] : not distended [de-identified] : no POP

## 2024-02-06 NOTE — HISTORY OF PRESENT ILLNESS
[Urinary Frequency] : no [x1] : nocturia once nightly [Urinary Tract Infection] : mild [] : yes [FreeTextEntry1] : JANELLE is a 78 year female who presents s/p TVH uterosacral TOT sling in 2015. Patient is here with her . C/o urinary symptoms.   Patient states for several months she feels that she might leak. Wears pads daily, but she has never leaked or had a wet pad. Voids about 2-3 times a day, rare nocturia, denies feeling incomplete emptying, dysuria. Daily fluid: 5 bottles of propel and 2-3 cups of coffee    Previous treatment: None  Vaginal symptoms: Denies Bowel symptoms: BM every 2-3 days, denies rectal bleeding   OB:  3, largest 8lb   GYN: 2cm left ov. fibroma Dr. Cruz observing, last pap  neg, No estrogen use, not sexually active  PMH: Back Problem, hernia, HTN, HLD PSH: hysterectomy ( unsure if partial or total)  Dr. Fiore, Carotid art. sx , vascular sx in leg  Meds: ASA, Atenolol, xarelto, Omeprazole, atorvistatin duloxetine Allx: NKDA  Former smoker, quit 5 years ago.

## 2024-02-06 NOTE — REASON FOR VISIT
[Initial Visit ___] : an initial visit for [unfilled] [Questionnaire Received] : Patient questionnaire received [Intake Form Reviewed] : Patient intake form with past medical history, surgical history, family history and social history reviewed today [Urinary Incontinence] : urinary incontinence [Spouse] : spouse [Urinary Urgency] : urinary urgency

## 2024-02-06 NOTE — ADDENDUM
[FreeTextEntry1] : This note was written by Georgette Gonzalez, acting as the  for Dr. Gutierrez. This note accurately reflects the work and decisions made by Dr. Gutierrez.

## 2024-02-06 NOTE — PROCEDURE
[Straight Catheterization] : insertion of a straight catheter [Urinary Frequency] : urinary frequency [Patient] : the patient [___ Fr Straight Tip] : a [unfilled] in [place] straight tip catheter [None] : there were no complications with the catheter insertion [Clear] : clear [No Complications] : no complications [Tolerated Well] : the patient tolerated the procedure well [Post procedure instructions and information given] : Post procedure instructions and information were given and reviewed with patient. [0] : 0

## 2024-02-06 NOTE — DISCUSSION/SUMMARY
[FreeTextEntry1] : JANELLE is a 78 year female who presents for urinary urgency. On exam, negative CST, normal PVR, no POP.  We reviewed her symptoms and exam findings. We discussed management options for overactive bladder including observation, behavioral modifications and bladder training, physical therapy and medications including anticholinergics and beta 3 agonists We discussed additional treatment options including sacral neuromodulation, PTNS and intra detrusor Botox. IUGA handout on overactive bladder and bladder training was given to her.  [] Pelvic sono ordered  [] Cysto  risks/benefits discussed [] BT [] Estrace - risks/benefits discussed/rx sent   All questions answered with patient and her .

## 2024-02-08 ENCOUNTER — NON-APPOINTMENT (OUTPATIENT)
Age: 79
End: 2024-02-08

## 2024-02-12 ENCOUNTER — APPOINTMENT (OUTPATIENT)
Dept: CARDIOLOGY | Facility: CLINIC | Age: 79
End: 2024-02-12
Payer: MEDICARE

## 2024-02-12 VITALS
BODY MASS INDEX: 22.86 KG/M2 | HEIGHT: 63 IN | WEIGHT: 129 LBS | DIASTOLIC BLOOD PRESSURE: 88 MMHG | OXYGEN SATURATION: 99 % | SYSTOLIC BLOOD PRESSURE: 130 MMHG | HEART RATE: 45 BPM

## 2024-02-12 DIAGNOSIS — R94.31 ABNORMAL ELECTROCARDIOGRAM [ECG] [EKG]: ICD-10-CM

## 2024-02-12 DIAGNOSIS — I73.9 PERIPHERAL VASCULAR DISEASE, UNSPECIFIED: ICD-10-CM

## 2024-02-12 DIAGNOSIS — I10 ESSENTIAL (PRIMARY) HYPERTENSION: ICD-10-CM

## 2024-02-12 DIAGNOSIS — E78.00 PURE HYPERCHOLESTEROLEMIA, UNSPECIFIED: ICD-10-CM

## 2024-02-12 DIAGNOSIS — I65.29 OCCLUSION AND STENOSIS OF UNSPECIFIED CAROTID ARTERY: ICD-10-CM

## 2024-02-12 PROCEDURE — 99214 OFFICE O/P EST MOD 30 MIN: CPT

## 2024-02-12 PROCEDURE — 93000 ELECTROCARDIOGRAM COMPLETE: CPT

## 2024-02-12 RX ORDER — ASCORBIC ACID 500 MG
500 TABLET ORAL DAILY
Refills: 0 | Status: ACTIVE | COMMUNITY

## 2024-02-12 NOTE — DISCUSSION/SUMMARY
[FreeTextEntry1] : hypotension/dizziness Continue to remain off of amlodipine and the ARB.  Continue atenolol 25 mg 1/2 tablet daily.  Continue to monitor home blood pressure readings  hypercholesterolemia Laboratories ordered   peripheral arterial disease History of carotid endarterectomy/lower extremity revascularization Follow-up with vascular  RTO 6 months [EKG obtained to assist in diagnosis and management of assessed problem(s)] : EKG obtained to assist in diagnosis and management of assessed problem(s)

## 2024-02-12 NOTE — CARDIOLOGY SUMMARY
[de-identified] : Normal sinus rhythm, increased RS ratio V2 [de-identified] : - Hypercholesterolemia - Hypertension: Home blood pressure cuff accurate.  Amlodipine and the patient's ARB were discontinued due to hypotension in October - Former Nicotine Dependence - Peripheral Arterial Disease: History of b/l LE percutaneous revascularization. Placed on Xarelto, aspirin, and cilostazol by vascular surgery - Family History of premature CAD: Brother CABG 40 - ECHOCARDIOGRAM: 6/22/2023, [facility], EF 55-60%, No PFO with agitated saline, trace mitral regurgitation, mild to moderate tricuspid regurgitation RVSP 23  - PHARMACOLOGIC NUCLEAR STRESS TEST: 5/25/2021, normal, EF >65% - CAROTID ULTRASOUND: 7/25/2022, LETI >70%, LICA 50-69%, LECA >50%, s/p R CEA 8/21/2022, L CEA 12/2022; CTA neck: 6/22/2023, B/L ICAs <50% - Anemia

## 2024-02-12 NOTE — HISTORY OF PRESENT ILLNESS
[FreeTextEntry1] : The patient is a 77-year-old white female with a past medical history remarkable for hypertension, hypercholesterolemia, carotid artery disease and peripheral arterial disease who presents for reevaluation of hypotension. The patient's ARB and amlodipine were discontinued.  The patient's blood pressure is elevated today.  The patient reports that her blood pressure is normal at home and at recent doctor visits.   The patient reports that she exercises on a stationary bicycle for 5 to 10 minutes 2 to 3 days/week without chest pain.

## 2024-02-29 ENCOUNTER — APPOINTMENT (OUTPATIENT)
Dept: OBGYN | Facility: CLINIC | Age: 79
End: 2024-02-29
Payer: MEDICARE

## 2024-02-29 VITALS
HEIGHT: 63 IN | BODY MASS INDEX: 22.32 KG/M2 | WEIGHT: 126 LBS | SYSTOLIC BLOOD PRESSURE: 142 MMHG | DIASTOLIC BLOOD PRESSURE: 88 MMHG

## 2024-02-29 DIAGNOSIS — Z83.3 FAMILY HISTORY OF DIABETES MELLITUS: ICD-10-CM

## 2024-02-29 DIAGNOSIS — Z78.9 OTHER SPECIFIED HEALTH STATUS: ICD-10-CM

## 2024-02-29 PROCEDURE — G0101: CPT

## 2024-02-29 NOTE — DISCUSSION/SUMMARY
[FreeTextEntry1] : 78 year  who presents s/p TVH uterosacral TOT sling in 2015 her for gyn annual  PCP: Dr. Bangura    #Well Woman Visit  1. Nutrition/Activity: The benefits of physical activity and balanced diet.  2. Health Screening: She was informed of the benefits of a screening colonoscopy/DEXA/Mammo/Pap. - Cervix: We reviewed ASCCP/ACOG guidelines for pap smear screening.  Hx of TVH for benign indication, no longer requires screening PAP  - Mammogram ordered 3. Sex Health: The importance of safe-sex practices was discussed with the patient. STD screening was offered to patient, she declines as she is not sexually active. 4. Osteoporosis, was unable to tolerate bisphosphonate treatment.  Started on IV BPP for management.  Encouraged vitamin D and calcium supplementation with weightbearing exercises.  If unable to tolerate weightbearing exercises, encouraged water-based aerobic exercises 5.  Urge incontinence: Started on vaginal estrace treatment. Plan for cystoscopy and bladder training. 6. Ovarian fibroma, most recent pelvic US WNL.  Unable to visualize ovaries.  Denies any symptoms at bedside.  Will repeat sonogram when 1 year     She verbalized understanding and agreement with above counseling regarding differential diagnosis, evaluation, and plan. She was given time for questions/concerns which were all answered to her apparent satisfaction.  RTO 1 year for annual

## 2024-02-29 NOTE — COUNSELING
[Nutrition/ Exercise/ Weight Management] : nutrition, exercise, weight management [Body Image] : body image [Sunscreen] : sunscreen [Vitamins/Supplements] : vitamins/supplements [Smoking Cessation] : smoking cessation [Breast Self Exam] : breast self exam [Confidentiality] : confidentiality [Bladder Hygiene] : bladder hygiene [Lab Results] : lab results [Medication Management] : medication management

## 2024-02-29 NOTE — HISTORY OF PRESENT ILLNESS
[N] : Patient is not sexually active [Regular Cycle Intervals] : periods have been regular [Menarche Age: ____] : age at menarche was [unfilled] [FreeTextEntry1] : 78 year  who presents s/p TVH uterosacral TOT sling in 2015 her for gyn annual  Overall doing well.  Was previously following with Dr. TIERNEY, last seen 2021 for GYN annual.  History of benign-appearing 2 cm left ovarian fibroma.  Asymptomatic.  Denies any pelvic bloating, pelvic pain or abdominal distention.  No longer currently sexually active.  Currently following with urogynecology for management of urgency incontinence.  Was started on vaginal estrogen cream with improvement of symptoms.  Denies any vaginal bleeding, or issues with constipation.  No further complaints at this time   Screening: - DEXA (2021): Osteoporosis- following with rheumatology for management - Mammogram (2023): BIRAD-2   OB:  3, largest 8lb GYN: 2cm left ov. fibroma Dr. Cruz observing, last pap  neg, No estrogen use, not sexually active PMH: Back Problem, hernia, HTN, HLD PSH: hysterectomy ( unsure if partial or total)  Dr. Fiore, Carotid art. sx , vascular sx in leg  Meds: ASA, Atenolol, xarelto, Omeprazole, atorvistatin duloxetine Allx: NKDA Former smoker, quit 5 years ago. [PGHxTotal] : 3 [PGHxPremature] : 0 [[facility]] : 3 [PGHxAbortions] : 0 [PGHxABInduced] : 0 [[facility]] : 3 [PGHxEctopic] : 0 [PGHxABSpont] : 0 [PGHxMultBirths] : 0

## 2024-02-29 NOTE — PHYSICAL EXAM
[Chaperone Present] : A chaperone was present in the examining room during all aspects of the physical examination [Appropriately responsive] : appropriately responsive [Alert] : alert [No Acute Distress] : no acute distress [Soft] : soft [No Lesions] : no lesions [Non-tender] : non-tender [Oriented x3] : oriented x3 [No Mass] : no mass [Examination Of The Breasts] : a normal appearance [No Masses] : no breast masses were palpable [Vulvar Atrophy] : vulvar atrophy [Labia Minora] : normal [Labia Majora] : normal [Normal] :  normal [Atrophy] : atrophy [Absent] : absent [Uterine Adnexae] : non-palpable

## 2024-03-01 ENCOUNTER — NON-APPOINTMENT (OUTPATIENT)
Age: 79
End: 2024-03-01

## 2024-03-12 ENCOUNTER — APPOINTMENT (OUTPATIENT)
Dept: UROGYNECOLOGY | Facility: CLINIC | Age: 79
End: 2024-03-12
Payer: MEDICARE

## 2024-03-12 PROCEDURE — 52000 CYSTOURETHROSCOPY: CPT

## 2024-03-12 PROCEDURE — 81003 URINALYSIS AUTO W/O SCOPE: CPT | Mod: QW

## 2024-03-12 RX ORDER — ESTRADIOL 0.1 MG/G
0.1 CREAM VAGINAL
Qty: 1 | Refills: 0 | Status: ACTIVE | COMMUNITY
Start: 2024-02-06 | End: 1900-01-01

## 2024-03-14 ENCOUNTER — NON-APPOINTMENT (OUTPATIENT)
Age: 79
End: 2024-03-14

## 2024-03-25 ENCOUNTER — APPOINTMENT (OUTPATIENT)
Dept: OBGYN | Facility: CLINIC | Age: 79
End: 2024-03-25
Payer: MEDICARE

## 2024-03-25 VITALS
SYSTOLIC BLOOD PRESSURE: 144 MMHG | WEIGHT: 126 LBS | DIASTOLIC BLOOD PRESSURE: 82 MMHG | HEIGHT: 63 IN | BODY MASS INDEX: 22.32 KG/M2

## 2024-03-25 DIAGNOSIS — Z12.39 ENCOUNTER FOR OTHER SCREENING FOR MALIGNANT NEOPLASM OF BREAST: ICD-10-CM

## 2024-03-25 DIAGNOSIS — N90.5 ATROPHY OF VULVA: ICD-10-CM

## 2024-03-25 PROCEDURE — 99214 OFFICE O/P EST MOD 30 MIN: CPT

## 2024-03-26 PROBLEM — Z12.39 BREAST CANCER SCREENING: Status: ACTIVE | Noted: 2024-02-29

## 2024-03-26 NOTE — DISCUSSION/SUMMARY
[FreeTextEntry1] : 78 year  (s/p TV) who presents for follow regarding mammogram and medication use.  #Vaginal atrophy - Discussed the treatment of genitourinary syndrome of menopause using vaginal estrogen - Discussed role of vaginal estrogen with alleviation of symptoms such as genital dryness, burning, and irritation, dyspareunia; urinary symptoms such as urgency or dysuria; and recurrent urinary tract infections related to GSM - MC side effects discussed: vaginal irritation or itching and, vaginal bleeding  #Results - Mammogram: BIRADS-2 - Discussed with patient DEXA scan demonstrating osteoporosis. Discussed vitamin D and calcium supplements. Following with rheumatology for treatment. Next appt 2024. Encouraged earlier visit

## 2024-03-26 NOTE — HISTORY OF PRESENT ILLNESS
[FreeTextEntry1] : 78 year  (s/p TVH) who presents for follow regarding mammogram and medication use.  Patient here to discuss vaginal estrogen use. Wants to discuss what it's role is, and how to use it safely. Also here to discuss mammogram results

## 2024-03-26 NOTE — PHYSICAL EXAM
[Appropriately responsive] : appropriately responsive [Chaperone Present] : A chaperone was present in the examining room during all aspects of the physical examination [No Acute Distress] : no acute distress [Alert] : alert [Oriented x3] : oriented x3

## 2024-03-26 NOTE — COUNSELING
[Nutrition/ Exercise/ Weight Management] : nutrition, exercise, weight management [Vitamins/Supplements] : vitamins/supplements [Body Image] : body image [Sunscreen] : sunscreen [Breast Self Exam] : breast self exam [Confidentiality] : confidentiality [Bladder Hygiene] : bladder hygiene [Medication Management] : medication management [Lab Results] : lab results

## 2024-04-01 RX ORDER — BACLOFEN 10 MG/1
10 TABLET ORAL EVERY 8 HOURS
Qty: 90 | Refills: 3 | Status: ACTIVE | COMMUNITY
Start: 2021-10-13 | End: 1900-01-01

## 2024-04-03 RX ORDER — ATENOLOL 25 MG/1
25 TABLET ORAL DAILY
Qty: 45 | Refills: 1 | Status: ACTIVE | COMMUNITY
Start: 1900-01-01 | End: 1900-01-01

## 2024-06-18 ENCOUNTER — APPOINTMENT (OUTPATIENT)
Dept: UROGYNECOLOGY | Facility: CLINIC | Age: 79
End: 2024-06-18
Payer: MEDICARE

## 2024-06-18 VITALS
HEIGHT: 63 IN | DIASTOLIC BLOOD PRESSURE: 97 MMHG | WEIGHT: 125 LBS | SYSTOLIC BLOOD PRESSURE: 159 MMHG | BODY MASS INDEX: 22.15 KG/M2

## 2024-06-18 LAB
BILIRUB UR QL STRIP: ABNORMAL
CLARITY UR: CLEAR
COLLECTION METHOD: NORMAL
GLUCOSE UR-MCNC: NEGATIVE
HCG UR QL: 1 EU/DL
HGB UR QL STRIP.AUTO: NEGATIVE
KETONES UR-MCNC: NEGATIVE
LEUKOCYTE ESTERASE UR QL STRIP: NEGATIVE
NITRITE UR QL STRIP: NEGATIVE
PH UR STRIP: 6
PROT UR STRIP-MCNC: NEGATIVE
SP GR UR STRIP: 1.02

## 2024-06-18 PROCEDURE — 81003 URINALYSIS AUTO W/O SCOPE: CPT | Mod: QW

## 2024-06-18 PROCEDURE — 51798 US URINE CAPACITY MEASURE: CPT

## 2024-06-18 PROCEDURE — 99214 OFFICE O/P EST MOD 30 MIN: CPT

## 2024-06-18 RX ORDER — ESTRADIOL 0.1 MG/G
0.1 CREAM VAGINAL
Qty: 1 | Refills: 0 | Status: ACTIVE | COMMUNITY
Start: 2024-06-18 | End: 1900-01-01

## 2024-06-18 RX ORDER — VIBEGRON 75 MG/1
75 TABLET, FILM COATED ORAL
Qty: 90 | Refills: 3 | Status: ACTIVE | COMMUNITY
Start: 2024-06-18 | End: 1900-01-01

## 2024-06-18 NOTE — PHYSICAL EXAM
[Chaperone Present] : A chaperone was present in the examining room during all aspects of the physical examination [No Acute Distress] : in no acute distress [Well developed] : well developed [Well Nourished] : ~L well nourished [Oriented x3] : oriented to person, place, and time [FreeTextEntry2] :  Heavenly Willis

## 2024-06-18 NOTE — HISTORY OF PRESENT ILLNESS
[Urinary Frequency] : no [x1] : nocturia once nightly [Urinary Tract Infection] : mild [] : yes [FreeTextEntry1] : JANELLE is a 78 year female who presents for f/u on urinary urgency. Last seen in office on 03/12/2024 for cysto, negative results. H/o TVH uterosacral TOT sling in 2015. Currently using Estrace 2-3 times weekly. Patient is doing good. Wears depends when she goes out of the house, sometimes has to change it. Reports no bothersome urinary symptoms.

## 2024-06-18 NOTE — ADDENDUM
[FreeTextEntry1] : This note was written by Heavenly Willis, acting as the  for Dr. Gutierrez. This note accurately reflects the work and decisions made by Dr. Gutierrez.

## 2024-06-18 NOTE — DISCUSSION/SUMMARY
[FreeTextEntry1] : JANELLE is a 78 year female who presents for f/u on OAB. Reports urinary incontinence.  We reviewed her symptoms and exam findings. We discussed management options for overactive bladder including observation, behavioral modifications and bladder training, physical therapy and medications including anticholinergics and beta 3 agonists. We discussed if behavioral modifications and medications fail proceeding with urodynamics to further evaluate her symptoms. We discussed additional treatment options including sacral neuromodulation, PTNS and intra detrusor Botox. IUGA handout on overactive bladder and bladder training was given to her.  [] Continue Estrace - renew rx [] Gemtesa 75mg - rx sent risks/benefits discussed  f/u 1 month  All questions answered.

## 2024-07-10 ENCOUNTER — RX RENEWAL (OUTPATIENT)
Age: 79
End: 2024-07-10

## 2024-07-10 ENCOUNTER — APPOINTMENT (OUTPATIENT)
Dept: RHEUMATOLOGY | Facility: CLINIC | Age: 79
End: 2024-07-10

## 2024-07-10 VITALS
HEART RATE: 75 BPM | SYSTOLIC BLOOD PRESSURE: 148 MMHG | DIASTOLIC BLOOD PRESSURE: 90 MMHG | OXYGEN SATURATION: 98 % | HEIGHT: 63 IN

## 2024-07-10 DIAGNOSIS — M54.9 DORSALGIA, UNSPECIFIED: ICD-10-CM

## 2024-07-10 DIAGNOSIS — Z79.83 AGE-RELATED OSTEOPOROSIS W/OUT CURRENT PATHOLOGICAL FRACTURE: ICD-10-CM

## 2024-07-10 DIAGNOSIS — M81.0 AGE-RELATED OSTEOPOROSIS W/OUT CURRENT PATHOLOGICAL FRACTURE: ICD-10-CM

## 2024-07-10 DIAGNOSIS — M47.816 SPONDYLOSIS W/OUT MYELOPATHY OR RADICULOPATHY, LUMBAR REGION: ICD-10-CM

## 2024-07-10 PROCEDURE — 99214 OFFICE O/P EST MOD 30 MIN: CPT

## 2024-07-10 PROCEDURE — 36415 COLL VENOUS BLD VENIPUNCTURE: CPT

## 2024-07-10 PROCEDURE — G2211 COMPLEX E/M VISIT ADD ON: CPT

## 2024-07-10 RX ADMIN — DENOSUMAB 0 MG/ML: 60 INJECTION SUBCUTANEOUS at 00:00

## 2024-07-11 LAB
ALP BLD-CCNC: 71 U/L
ANION GAP SERPL CALC-SCNC: 11 MMOL/L
AST SERPL-CCNC: 24 U/L
BILIRUB SERPL-MCNC: 0.7 MG/DL
BUN SERPL-MCNC: 16 MG/DL
CALCIUM SERPL-MCNC: 10.9 MG/DL
CHLORIDE SERPL-SCNC: 105 MMOL/L
CO2 SERPL-SCNC: 27 MMOL/L
CREAT SERPL-MCNC: 0.75 MG/DL
EGFR: 80 ML/MIN/1.73M2
GLUCOSE SERPL-MCNC: 82 MG/DL
POTASSIUM SERPL-SCNC: 4.1 MMOL/L
PROT SERPL-MCNC: 6.3 G/DL

## 2024-07-11 RX ORDER — DENOSUMAB 60 MG/ML
60 INJECTION SUBCUTANEOUS
Qty: 1 | Refills: 0 | Status: ACTIVE | COMMUNITY
Start: 2024-07-10

## 2024-07-16 ENCOUNTER — APPOINTMENT (OUTPATIENT)
Dept: RHEUMATOLOGY | Facility: CLINIC | Age: 79
End: 2024-07-16

## 2024-07-22 ENCOUNTER — APPOINTMENT (OUTPATIENT)
Dept: UROGYNECOLOGY | Facility: CLINIC | Age: 79
End: 2024-07-22
Payer: MEDICARE

## 2024-07-22 VITALS — SYSTOLIC BLOOD PRESSURE: 126 MMHG | DIASTOLIC BLOOD PRESSURE: 80 MMHG | HEART RATE: 82 BPM

## 2024-07-22 DIAGNOSIS — R39.15 URGENCY OF URINATION: ICD-10-CM

## 2024-07-22 DIAGNOSIS — N32.81 OVERACTIVE BLADDER: ICD-10-CM

## 2024-07-22 PROCEDURE — 51798 US URINE CAPACITY MEASURE: CPT

## 2024-07-22 PROCEDURE — 99213 OFFICE O/P EST LOW 20 MIN: CPT

## 2024-07-22 NOTE — HISTORY OF PRESENT ILLNESS
[FreeTextEntry1] : May presents to the office today, with her , with h/o OAB on Gemtesa 75mg which was started at last visit on 6/18/24. H/o TVH uterosacral TOT sling in 2015. Currently using Estrace 2-3 times weekly. Pt reports 60% improvement in symptoms since starting Gemtesa. Pt states significantly less urgency and nocturia down to 1x/night rather than 2-3x prior to Gemtesa. Pt continues to wear pads during the day but states they are mostly dry. Denies any increase in urinary frequency or urgency. Denies fever, chills, dysuria, hematuria or flank pain. Denies sensation of incomplete bladder emptying. PVR normal today. Denies SE of Gemtesa   PVR: 0 BP: 126/80

## 2024-07-22 NOTE — DISCUSSION/SUMMARY
[FreeTextEntry1] : May is an 77yo F who presents for f/u on OAB. Pt on Gemtesa 75mg and is happy with symptom improvement so far. Pt would like to continue with current management. Pt will call when refills are needed. Pt will c/w Gemtesa and Estrace. Pt to F/u in 3-6 months for medication follow up or sooner if needed. All questions answered. Instructed to call with any questions or concerns.

## 2024-07-23 ENCOUNTER — APPOINTMENT (OUTPATIENT)
Dept: DERMATOLOGY | Facility: CLINIC | Age: 79
End: 2024-07-23

## 2024-08-12 ENCOUNTER — APPOINTMENT (OUTPATIENT)
Dept: CARDIOLOGY | Facility: CLINIC | Age: 79
End: 2024-08-12
Payer: MEDICARE

## 2024-08-12 VITALS
HEIGHT: 63 IN | DIASTOLIC BLOOD PRESSURE: 84 MMHG | HEART RATE: 71 BPM | SYSTOLIC BLOOD PRESSURE: 130 MMHG | WEIGHT: 125 LBS | OXYGEN SATURATION: 98 % | BODY MASS INDEX: 22.15 KG/M2

## 2024-08-12 DIAGNOSIS — R94.31 ABNORMAL ELECTROCARDIOGRAM [ECG] [EKG]: ICD-10-CM

## 2024-08-12 DIAGNOSIS — Z01.810 ENCOUNTER FOR PREPROCEDURAL CARDIOVASCULAR EXAMINATION: ICD-10-CM

## 2024-08-12 DIAGNOSIS — I77.9 DISORDER OF ARTERIES AND ARTERIOLES, UNSPECIFIED: ICD-10-CM

## 2024-08-12 DIAGNOSIS — I10 ESSENTIAL (PRIMARY) HYPERTENSION: ICD-10-CM

## 2024-08-12 DIAGNOSIS — E78.00 PURE HYPERCHOLESTEROLEMIA, UNSPECIFIED: ICD-10-CM

## 2024-08-12 PROCEDURE — 99214 OFFICE O/P EST MOD 30 MIN: CPT

## 2024-08-12 PROCEDURE — 93000 ELECTROCARDIOGRAM COMPLETE: CPT

## 2024-08-12 RX ORDER — AMLODIPINE BESYLATE 5 MG/1
5 TABLET ORAL DAILY
Refills: 0 | Status: ACTIVE | COMMUNITY

## 2024-08-12 RX ORDER — AMLODIPINE BESYLATE 5 MG/1
TABLET ORAL DAILY
Refills: 0 | Status: ACTIVE | COMMUNITY

## 2024-08-12 RX ORDER — CILOSTAZOL 100 MG/1
100 TABLET ORAL TWICE DAILY
Refills: 0 | Status: ACTIVE | COMMUNITY

## 2024-08-12 NOTE — DISCUSSION/SUMMARY
[FreeTextEntry1] : Preoperative cardiovascular evaluation: Cervical spine epidural injection  In view of the patient's normal ejection fraction, normal stress test, adequate functional capacity, and the absence of unstable angina, congestive heart failure, or severe aortic stenosis, she is at average risk for perioperative cardiovascular morbidity and mortality, and cleared from a cardiology standpoint with the following recommendations: The patient was instructed to take her atenolol with a sip of water the morning of the procedure. Aspirin may be held 1 week prior to the procedure from a cardiology standpoint.  Due to the patient's history of bilateral lower extremity percutaneous revascularization, she was instructed to receive aspirin instructions from her vascular surgeon. Endocarditis prophylaxis and a post procedure monitored bed and not indicated   hypotension/dizziness Continue to remain off of amlodipine and the ARB.  Continue atenolol 25 mg 1/2 tablet daily.  Continue to monitor home blood pressure readings  hypercholesterolemia Cholesterol acceptable 2/21/2024   peripheral arterial disease History of carotid endarterectomy/lower extremity revascularization Followed by vascular   RTO 6 months [EKG obtained to assist in diagnosis and management of assessed problem(s)] : EKG obtained to assist in diagnosis and management of assessed problem(s)

## 2024-08-12 NOTE — HISTORY OF PRESENT ILLNESS
[FreeTextEntry1] : The patient is a 78-year-old white female with a past medical history remarkable for hypertension, hypercholesterolemia, carotid artery disease and peripheral arterial disease who presents for reevaluation prior to cervical epidural injection. The patient had been exercising on a stationary bicycle for 5 to 10 minutes 2 to 3 days/week without chest pain or dyspnea.

## 2024-08-12 NOTE — CARDIOLOGY SUMMARY
[de-identified] : Normal sinus rhythm, increased RS ratio V2, no significant change from prior [de-identified] : - Hypercholesterolemia - Hypertension: Home blood pressure cuff accurate.  Amlodipine and the patient's ARB were discontinued due to hypotension in October - Former Nicotine Dependence - Peripheral Arterial Disease: History of b/l LE percutaneous revascularization. Placed on Xarelto, aspirin, and cilostazol by vascular surgery - Family History of premature CAD: Brother CABG 40 - ECHOCARDIOGRAM: 6/22/2023, [facility], EF 55-60%, No PFO with agitated saline, trace mitral regurgitation, mild to moderate tricuspid regurgitation RVSP 23  - PHARMACOLOGIC NUCLEAR STRESS TEST: 5/25/2021, normal, EF >65% - CAROTID ULTRASOUND: 7/25/2022, LETI >70%, LICA 50-69%, LECA >50%, s/p R CEA 8/21/2022, L CEA 12/2022; CTA neck: 6/22/2023, B/L ICAs <50% - Anemia

## 2024-08-30 ENCOUNTER — NON-APPOINTMENT (OUTPATIENT)
Age: 79
End: 2024-08-30

## 2024-08-31 ENCOUNTER — NON-APPOINTMENT (OUTPATIENT)
Age: 79
End: 2024-08-31

## 2024-09-06 ENCOUNTER — NON-APPOINTMENT (OUTPATIENT)
Age: 79
End: 2024-09-06

## 2024-09-12 ENCOUNTER — NON-APPOINTMENT (OUTPATIENT)
Age: 79
End: 2024-09-12

## 2024-09-16 ENCOUNTER — APPOINTMENT (OUTPATIENT)
Dept: ORTHOPEDIC SURGERY | Facility: CLINIC | Age: 79
End: 2024-09-16
Payer: MEDICARE

## 2024-09-16 DIAGNOSIS — M25.512 PAIN IN LEFT SHOULDER: ICD-10-CM

## 2024-09-16 PROCEDURE — 99203 OFFICE O/P NEW LOW 30 MIN: CPT

## 2024-09-16 RX ORDER — TRAMADOL HYDROCHLORIDE 50 MG/1
50 TABLET, COATED ORAL EVERY 6 HOURS
Qty: 20 | Refills: 0 | Status: ACTIVE | COMMUNITY
Start: 2024-09-16 | End: 1900-01-01

## 2024-09-16 NOTE — DISCUSSION/SUMMARY
[de-identified] : Assessment: 78-year-old female with left shoulder pain secondary to a proximal biceps tendon rupture  Plan: I had a long discussion with the patient today regarding the nature of their diagnosis and treatment plan. We discussed the risks and benefits of no treatment as well as nonoperative and operative treatments.  I reviewed the patient's MRI today with her in the office which demonstrates high-grade partial tearing of the biceps as well as partial tearing of her rotator cuff.  Since MRI she has ruptured the long head of the biceps tendon.  On examination she has well-preserved range of motion and strength of her rotator cuff.  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  At this time I recommend conservative treatment of the patient's condition with modalities including rest, ice, heat, anti-inflammatory medications, activity modifications, and home stretching and strengthening exercises.  Patient cannot take NSAIDs because she is on Xarelto.  She will ice the arm as well as take Tylenol as needed for discomfort.  She deferred any formal physical therapy.  At this point she may gradually increase her activity as tolerated.  She will follow-up in 6 weeks as needed.  If symptoms persist we may consider beginning a formalized physical therapy program versus a cortisone injection should there be any lingering shoulder pain.  The patient verbalizes their understanding and agrees with the plan.  All questions were answered to their satisfaction.

## 2024-09-16 NOTE — HISTORY OF PRESENT ILLNESS
[de-identified] : 9/16/2024: May is a pleasant 78-year-old right-hand-dominant female presents the office today for evaluation of a left shoulder injury.  The patient states that her shoulder has been bothering her for the past month or so.  She saw her regular doctor who ordered an MRI which demonstrated partial rotator cuff tearing as well as a biceps partial tear.  Since the MRI she has noticed another injury where she felt a pop in her shoulder and noticed bruising and swelling.  She was referred to me for specialist opinion.  She has never injured the shoulder before.  She has not had any formal treatment yet.  She denies any fevers, chills, sweats, or pain elsewhere.

## 2024-09-16 NOTE — PHYSICAL EXAM
[de-identified] : General: Awake, alert, no acute distress, Patient was cooperative and appropriate during the examination.  The patient is of normal weight for height and age.   Left shoulder Exam: Physical exam of the shoulder demonstrates normal skin without signs of skin changes or abnormalities. No erythema, warmth, or joint effusion appreciated.  Extensive ecchymosis about the arm through the antecubital fossa.  Jean deformity is noted.  Sensation intact light touch C5-T1 Palpable radial pulse Radial/ulnar/median/axillary/musculocutaneous/AIN/PIN nerves grossly intact  Range of motion: Forward Flexion: 180 Abduction: 150 External Rotation: 60 Internal Rotation: T7  Palpation: Not tender to palpation over the glenohumeral joint Not tender palpation over the rotator cuff insertion on the greater tuberosity Not tender to palpation over the AC joint Mildly tender to palpation of the biceps tendon/bicipital groove  Strength testing: Supraspinatus: 5/5 Infraspinatus: 5/5 Subscapularis: 5/5  Special test: Empty can test negative  Newton impingement test negative Speeds test mildly positive [place]'s test negative Lift-off test negative Bell-press test negative Cross-arm adduction test negative  [de-identified] : MRI of the left shoulder from  radiology was reviewed today.  The patient has high-grade partial tearing of the supraspinatus and subscapularis tendons.  There is also high-grade partial tearing of the long head of the biceps tendon.

## 2024-10-01 ENCOUNTER — APPOINTMENT (OUTPATIENT)
Dept: UROGYNECOLOGY | Facility: CLINIC | Age: 79
End: 2024-10-01

## 2024-10-01 VITALS
SYSTOLIC BLOOD PRESSURE: 148 MMHG | BODY MASS INDEX: 22.15 KG/M2 | DIASTOLIC BLOOD PRESSURE: 80 MMHG | WEIGHT: 125 LBS | HEIGHT: 63 IN

## 2024-10-01 DIAGNOSIS — N95.2 POSTMENOPAUSAL ATROPHIC VAGINITIS: ICD-10-CM

## 2024-10-01 DIAGNOSIS — N32.81 OVERACTIVE BLADDER: ICD-10-CM

## 2024-10-01 PROCEDURE — 99213 OFFICE O/P EST LOW 20 MIN: CPT

## 2024-10-01 PROCEDURE — 51798 US URINE CAPACITY MEASURE: CPT

## 2024-10-01 NOTE — HISTORY OF PRESENT ILLNESS
[FreeTextEntry1] : May presents to the office today with h/o OAB. Pt was on Gemtesa 75mg that she started in June 2024. Pt states since being on Gemtesa has had 2 UTIs: 8/31/24 >100,000 CFU/ml Escherichia coli, and 9/7/24 >100,000 CFU/ml Escherichia coli treated with abx from Urgent Care but pt does not recall which abx. Pt states as a result of UTIs stopped the Gemtesa soon after 2nd UTI. Pt states has been feeling better since off the Gemtesa. Denies any increase in urinary frequency or urgency. Denies fever. chills, dysuria, hematuria or flank pain. Denies sensation of incomplete bladder emptying. Pt continues to wear Depends "just in case", but more often than not pads are dry. Pt c/w vaginal estrogen TIW s/p TVH uterosacral TOT sling in 2015. Pt states vaginal estrogen "has been very helpful".

## 2024-10-01 NOTE — DISCUSSION/SUMMARY
[FreeTextEntry1] : May is an 77yo F who presents for f/u on OAB. Pt was on Gemtesa but d/karli due to having 2 UTIs. We discussed management options for overactive bladder including observation, behavioral modifications and bladder training, physical therapy and medications including anticholinergics and beta 3 agonists. We discussed additional treatment options including sacral neuromodulation, PTNS and intra detrusor Botox. At this time pt would not like to try any OAB management options. Pt will c/w vaginal estrogen and will follow up with us as needed. Vaginal estrogen eRx refill sent to pt's preferred pharmacy. Pt will call when more refills are needed. Pt verbalized understanding. All questions answered. Instructed to call with any questions or concerns.

## 2024-10-01 NOTE — PHYSICAL EXAM
[No Acute Distress] : in no acute distress [Well developed] : well developed [Well Nourished] : ~L well nourished [Good Hygeine] : demonstrates good hygeine [Oriented x3] : oriented to person, place, and time [Normal Memory] : ~T memory was ~L unimpaired [Normal Mood/Affect] : mood and affect are normal [None] : no CVA tenderness [Warm and Dry] : was warm and dry to touch [Normal Gait] : gait was normal [Cough] : no cough

## 2024-10-08 ENCOUNTER — APPOINTMENT (OUTPATIENT)
Dept: ORTHOPEDIC SURGERY | Facility: CLINIC | Age: 79
End: 2024-10-08
Payer: MEDICARE

## 2024-10-08 VITALS
WEIGHT: 125 LBS | SYSTOLIC BLOOD PRESSURE: 172 MMHG | DIASTOLIC BLOOD PRESSURE: 75 MMHG | HEIGHT: 63 IN | HEART RATE: 74 BPM | BODY MASS INDEX: 22.15 KG/M2

## 2024-10-08 DIAGNOSIS — M25.512 PAIN IN LEFT SHOULDER: ICD-10-CM

## 2024-10-08 PROCEDURE — 20610 DRAIN/INJ JOINT/BURSA W/O US: CPT | Mod: LT

## 2024-10-08 PROCEDURE — 99214 OFFICE O/P EST MOD 30 MIN: CPT | Mod: 25

## 2024-10-08 RX ORDER — TRAMADOL HYDROCHLORIDE 50 MG/1
50 TABLET, COATED ORAL
Qty: 14 | Refills: 0 | Status: ACTIVE | COMMUNITY
Start: 2024-10-08 | End: 1900-01-01

## 2024-10-25 ENCOUNTER — APPOINTMENT (OUTPATIENT)
Dept: OBGYN | Facility: CLINIC | Age: 79
End: 2024-10-25
Payer: MEDICARE

## 2024-10-25 VITALS
BODY MASS INDEX: 22.15 KG/M2 | WEIGHT: 125 LBS | HEIGHT: 63 IN | SYSTOLIC BLOOD PRESSURE: 120 MMHG | DIASTOLIC BLOOD PRESSURE: 82 MMHG

## 2024-10-25 PROCEDURE — 99213 OFFICE O/P EST LOW 20 MIN: CPT

## 2024-10-25 PROCEDURE — 99459 PELVIC EXAMINATION: CPT

## 2024-10-28 ENCOUNTER — APPOINTMENT (OUTPATIENT)
Dept: ORTHOPEDIC SURGERY | Facility: CLINIC | Age: 79
End: 2024-10-28

## 2024-11-07 ENCOUNTER — APPOINTMENT (OUTPATIENT)
Dept: ORTHOPEDIC SURGERY | Facility: CLINIC | Age: 79
End: 2024-11-07

## 2024-11-11 ENCOUNTER — APPOINTMENT (OUTPATIENT)
Dept: ORTHOPEDIC SURGERY | Facility: CLINIC | Age: 79
End: 2024-11-11
Payer: MEDICARE

## 2024-11-11 VITALS — SYSTOLIC BLOOD PRESSURE: 148 MMHG | DIASTOLIC BLOOD PRESSURE: 91 MMHG | HEART RATE: 81 BPM

## 2024-11-11 VITALS
BODY MASS INDEX: 22.15 KG/M2 | WEIGHT: 125 LBS | HEIGHT: 63 IN | SYSTOLIC BLOOD PRESSURE: 146 MMHG | DIASTOLIC BLOOD PRESSURE: 94 MMHG | HEART RATE: 85 BPM

## 2024-11-11 DIAGNOSIS — M25.512 PAIN IN LEFT SHOULDER: ICD-10-CM

## 2024-11-11 PROCEDURE — 99213 OFFICE O/P EST LOW 20 MIN: CPT

## 2024-12-10 ENCOUNTER — NON-APPOINTMENT (OUTPATIENT)
Age: 79
End: 2024-12-10

## 2024-12-10 ENCOUNTER — APPOINTMENT (OUTPATIENT)
Dept: CARDIOLOGY | Facility: CLINIC | Age: 79
End: 2024-12-10
Payer: MEDICARE

## 2024-12-10 VITALS
SYSTOLIC BLOOD PRESSURE: 126 MMHG | BODY MASS INDEX: 22.15 KG/M2 | WEIGHT: 125 LBS | DIASTOLIC BLOOD PRESSURE: 84 MMHG | HEIGHT: 63 IN | OXYGEN SATURATION: 96 % | HEART RATE: 73 BPM

## 2024-12-10 DIAGNOSIS — E78.00 PURE HYPERCHOLESTEROLEMIA, UNSPECIFIED: ICD-10-CM

## 2024-12-10 DIAGNOSIS — I73.9 PERIPHERAL VASCULAR DISEASE, UNSPECIFIED: ICD-10-CM

## 2024-12-10 DIAGNOSIS — I10 ESSENTIAL (PRIMARY) HYPERTENSION: ICD-10-CM

## 2024-12-10 PROCEDURE — 99214 OFFICE O/P EST MOD 30 MIN: CPT

## 2024-12-10 PROCEDURE — 93000 ELECTROCARDIOGRAM COMPLETE: CPT

## 2025-01-06 ENCOUNTER — APPOINTMENT (OUTPATIENT)
Dept: ORTHOPEDIC SURGERY | Facility: CLINIC | Age: 80
End: 2025-01-06

## 2025-01-06 ENCOUNTER — APPOINTMENT (OUTPATIENT)
Dept: ULTRASOUND IMAGING | Facility: CLINIC | Age: 80
End: 2025-01-06
Payer: MEDICARE

## 2025-01-06 ENCOUNTER — OUTPATIENT (OUTPATIENT)
Dept: OUTPATIENT SERVICES | Facility: HOSPITAL | Age: 80
LOS: 1 days | End: 2025-01-06
Payer: MEDICARE

## 2025-01-06 DIAGNOSIS — Z98.89 OTHER SPECIFIED POSTPROCEDURAL STATES: Chronic | ICD-10-CM

## 2025-01-06 DIAGNOSIS — K11.9 DISEASE OF SALIVARY GLAND, UNSPECIFIED: ICD-10-CM

## 2025-01-06 PROCEDURE — 76536 US EXAM OF HEAD AND NECK: CPT | Mod: 26

## 2025-01-06 PROCEDURE — 76536 US EXAM OF HEAD AND NECK: CPT

## 2025-01-09 ENCOUNTER — NON-APPOINTMENT (OUTPATIENT)
Age: 80
End: 2025-01-09

## 2025-01-15 DIAGNOSIS — K11.9 DISEASE OF SALIVARY GLAND, UNSPECIFIED: ICD-10-CM

## 2025-02-18 DIAGNOSIS — E78.00 PURE HYPERCHOLESTEROLEMIA, UNSPECIFIED: ICD-10-CM

## 2025-02-19 ENCOUNTER — APPOINTMENT (OUTPATIENT)
Dept: RHEUMATOLOGY | Facility: CLINIC | Age: 80
End: 2025-02-19
Payer: MEDICARE

## 2025-02-19 VITALS
TEMPERATURE: 98 F | HEIGHT: 63 IN | SYSTOLIC BLOOD PRESSURE: 128 MMHG | OXYGEN SATURATION: 97 % | HEART RATE: 71 BPM | DIASTOLIC BLOOD PRESSURE: 70 MMHG

## 2025-02-19 DIAGNOSIS — M81.0 AGE-RELATED OSTEOPOROSIS W/OUT CURRENT PATHOLOGICAL FRACTURE: ICD-10-CM

## 2025-02-19 DIAGNOSIS — Z79.83 AGE-RELATED OSTEOPOROSIS W/OUT CURRENT PATHOLOGICAL FRACTURE: ICD-10-CM

## 2025-02-19 DIAGNOSIS — M47.816 SPONDYLOSIS W/OUT MYELOPATHY OR RADICULOPATHY, LUMBAR REGION: ICD-10-CM

## 2025-02-19 PROCEDURE — 99214 OFFICE O/P EST MOD 30 MIN: CPT | Mod: 25

## 2025-02-19 PROCEDURE — 96372 THER/PROPH/DIAG INJ SC/IM: CPT

## 2025-02-19 RX ORDER — DENOSUMAB 60 MG/ML
60 INJECTION SUBCUTANEOUS
Qty: 0 | Refills: 0 | Status: COMPLETED | OUTPATIENT
Start: 2025-02-19

## 2025-02-19 RX ADMIN — DENOSUMAB 0 MG/ML: 60 INJECTION SUBCUTANEOUS at 00:00

## 2025-03-03 ENCOUNTER — NON-APPOINTMENT (OUTPATIENT)
Age: 80
End: 2025-03-03

## 2025-03-03 ENCOUNTER — APPOINTMENT (OUTPATIENT)
Dept: OBGYN | Facility: CLINIC | Age: 80
End: 2025-03-03
Payer: MEDICARE

## 2025-03-03 VITALS
HEIGHT: 63 IN | DIASTOLIC BLOOD PRESSURE: 78 MMHG | SYSTOLIC BLOOD PRESSURE: 128 MMHG | BODY MASS INDEX: 22.15 KG/M2 | WEIGHT: 125 LBS

## 2025-03-03 DIAGNOSIS — N90.5 ATROPHY OF VULVA: ICD-10-CM

## 2025-03-03 DIAGNOSIS — M81.0 AGE-RELATED OSTEOPOROSIS W/OUT CURRENT PATHOLOGICAL FRACTURE: ICD-10-CM

## 2025-03-03 DIAGNOSIS — Z01.419 ENCOUNTER FOR GYNECOLOGICAL EXAMINATION (GENERAL) (ROUTINE) W/OUT ABNORMAL FINDINGS: ICD-10-CM

## 2025-03-03 DIAGNOSIS — N32.81 OVERACTIVE BLADDER: ICD-10-CM

## 2025-03-03 PROCEDURE — 99459 PELVIC EXAMINATION: CPT

## 2025-03-03 PROCEDURE — 99214 OFFICE O/P EST MOD 30 MIN: CPT

## 2025-04-07 ENCOUNTER — APPOINTMENT (OUTPATIENT)
Dept: ORTHOPEDIC SURGERY | Facility: CLINIC | Age: 80
End: 2025-04-07
Payer: MEDICARE

## 2025-04-07 DIAGNOSIS — M25.512 PAIN IN LEFT SHOULDER: ICD-10-CM

## 2025-04-07 DIAGNOSIS — M75.112 INCOMPLETE ROTATOR CUFF TEAR OR RUPTURE OF LEFT SHOULDER, NOT SPECIFIED AS TRAUMATIC: ICD-10-CM

## 2025-04-07 PROCEDURE — 99214 OFFICE O/P EST MOD 30 MIN: CPT

## 2025-04-07 RX ORDER — METHYLPREDNISOLONE 4 MG/1
4 TABLET ORAL
Qty: 1 | Refills: 0 | Status: ACTIVE | COMMUNITY
Start: 2025-04-07 | End: 1900-01-01

## 2025-05-05 ENCOUNTER — RESULT REVIEW (OUTPATIENT)
Age: 80
End: 2025-05-05

## 2025-05-05 ENCOUNTER — APPOINTMENT (OUTPATIENT)
Dept: DERMATOLOGY | Facility: CLINIC | Age: 80
End: 2025-05-05
Payer: MEDICARE

## 2025-05-05 PROCEDURE — 11102 TANGNTL BX SKIN SINGLE LES: CPT

## 2025-05-05 PROCEDURE — 99203 OFFICE O/P NEW LOW 30 MIN: CPT | Mod: 25

## 2025-06-09 ENCOUNTER — APPOINTMENT (OUTPATIENT)
Dept: ORTHOPEDIC SURGERY | Facility: CLINIC | Age: 80
End: 2025-06-09
Payer: MEDICARE

## 2025-06-09 VITALS — WEIGHT: 125 LBS | BODY MASS INDEX: 22.15 KG/M2 | HEIGHT: 63 IN

## 2025-06-09 PROCEDURE — 99214 OFFICE O/P EST MOD 30 MIN: CPT

## 2025-06-13 ENCOUNTER — APPOINTMENT (OUTPATIENT)
Dept: ORTHOPEDIC SURGERY | Facility: CLINIC | Age: 80
End: 2025-06-13

## 2025-06-18 ENCOUNTER — APPOINTMENT (OUTPATIENT)
Dept: ORTHOPEDIC SURGERY | Facility: CLINIC | Age: 80
End: 2025-06-18

## 2025-06-26 ENCOUNTER — APPOINTMENT (OUTPATIENT)
Dept: CARDIOLOGY | Facility: CLINIC | Age: 80
End: 2025-06-26
Payer: MEDICARE

## 2025-06-26 VITALS
OXYGEN SATURATION: 97 % | HEIGHT: 63 IN | HEART RATE: 76 BPM | BODY MASS INDEX: 26.58 KG/M2 | DIASTOLIC BLOOD PRESSURE: 74 MMHG | SYSTOLIC BLOOD PRESSURE: 134 MMHG | WEIGHT: 150 LBS

## 2025-06-26 PROCEDURE — 93000 ELECTROCARDIOGRAM COMPLETE: CPT

## 2025-06-26 PROCEDURE — 99214 OFFICE O/P EST MOD 30 MIN: CPT

## 2025-07-07 ENCOUNTER — APPOINTMENT (OUTPATIENT)
Dept: ORTHOPEDIC SURGERY | Facility: CLINIC | Age: 80
End: 2025-07-07

## 2025-08-04 ENCOUNTER — APPOINTMENT (OUTPATIENT)
Dept: ORTHOPEDIC SURGERY | Facility: CLINIC | Age: 80
End: 2025-08-04
Payer: MEDICARE

## 2025-08-04 VITALS — BODY MASS INDEX: 26.58 KG/M2 | HEIGHT: 63 IN | WEIGHT: 150 LBS

## 2025-08-04 DIAGNOSIS — M75.81 OTHER SHOULDER LESIONS, RIGHT SHOULDER: ICD-10-CM

## 2025-08-04 PROCEDURE — 73030 X-RAY EXAM OF SHOULDER: CPT | Mod: RT

## 2025-08-04 PROCEDURE — 99214 OFFICE O/P EST MOD 30 MIN: CPT | Mod: 25

## 2025-08-04 PROCEDURE — 20611 DRAIN/INJ JOINT/BURSA W/US: CPT | Mod: RT

## 2025-08-14 ENCOUNTER — RX RENEWAL (OUTPATIENT)
Age: 80
End: 2025-08-14

## 2025-08-20 ENCOUNTER — APPOINTMENT (OUTPATIENT)
Dept: RHEUMATOLOGY | Facility: CLINIC | Age: 80
End: 2025-08-20
Payer: MEDICARE

## 2025-08-20 VITALS
OXYGEN SATURATION: 93 % | HEIGHT: 63 IN | WEIGHT: 150 LBS | SYSTOLIC BLOOD PRESSURE: 130 MMHG | HEART RATE: 80 BPM | DIASTOLIC BLOOD PRESSURE: 80 MMHG | BODY MASS INDEX: 26.58 KG/M2 | TEMPERATURE: 97.6 F

## 2025-08-20 DIAGNOSIS — M81.0 AGE-RELATED OSTEOPOROSIS W/OUT CURRENT PATHOLOGICAL FRACTURE: ICD-10-CM

## 2025-08-20 DIAGNOSIS — Z79.83 AGE-RELATED OSTEOPOROSIS W/OUT CURRENT PATHOLOGICAL FRACTURE: ICD-10-CM

## 2025-08-20 DIAGNOSIS — Z79.620 ENCOUNTER FOR THERAPEUTIC DRUG LVL MONITORING: ICD-10-CM

## 2025-08-20 DIAGNOSIS — Z51.81 ENCOUNTER FOR THERAPEUTIC DRUG LVL MONITORING: ICD-10-CM

## 2025-08-20 DIAGNOSIS — M47.816 SPONDYLOSIS W/OUT MYELOPATHY OR RADICULOPATHY, LUMBAR REGION: ICD-10-CM

## 2025-08-20 PROCEDURE — 96372 THER/PROPH/DIAG INJ SC/IM: CPT

## 2025-08-20 PROCEDURE — 99214 OFFICE O/P EST MOD 30 MIN: CPT | Mod: 25

## 2025-08-21 PROBLEM — Z51.81 ENCOUNTER FOR MONITORING DENOSUMAB THERAPY: Status: ACTIVE | Noted: 2025-08-21

## 2025-08-21 PROBLEM — M81.0 ENCOUNTER FOR ONGOING OSTEOPOROSIS BISPHOSPHONATE THERAPY: Status: RESOLVED | Noted: 2020-07-27 | Resolved: 2025-08-21

## 2025-08-21 RX ORDER — DENOSUMAB 60 MG/ML
60 INJECTION SUBCUTANEOUS
Qty: 1 | Refills: 0 | Status: COMPLETED | OUTPATIENT
Start: 2025-08-21

## 2025-08-21 RX ADMIN — DENOSUMAB 0 MG/ML: 60 INJECTION SUBCUTANEOUS at 00:00
